# Patient Record
Sex: MALE | Race: WHITE | NOT HISPANIC OR LATINO | Employment: STUDENT | URBAN - METROPOLITAN AREA
[De-identification: names, ages, dates, MRNs, and addresses within clinical notes are randomized per-mention and may not be internally consistent; named-entity substitution may affect disease eponyms.]

---

## 2017-02-06 ENCOUNTER — TELEPHONE (OUTPATIENT)
Dept: PEDIATRIC NEUROLOGY | Facility: CLINIC | Age: 22
End: 2017-02-06

## 2017-02-27 ENCOUNTER — TELEPHONE (OUTPATIENT)
Dept: PEDIATRIC NEUROLOGY | Facility: CLINIC | Age: 22
End: 2017-02-27

## 2017-03-13 ENCOUNTER — TELEPHONE (OUTPATIENT)
Dept: PEDIATRIC NEUROLOGY | Facility: CLINIC | Age: 22
End: 2017-03-13

## 2017-03-20 RX ORDER — AMITRIPTYLINE HYDROCHLORIDE 10 MG/1
TABLET, FILM COATED ORAL
Qty: 60 TABLET | Refills: 0 | OUTPATIENT
Start: 2017-03-20

## 2017-06-20 ENCOUNTER — TELEPHONE (OUTPATIENT)
Dept: PEDIATRIC NEUROLOGY | Facility: CLINIC | Age: 22
End: 2017-06-20

## 2017-06-20 NOTE — TELEPHONE ENCOUNTER
Alivia, i will renew enough for an apptment. i have not seen him in 14 months. Thank you. Carmen correa 6/20/17 2:23 pm

## 2017-06-23 NOTE — TELEPHONE ENCOUNTER
Attempted to contact parents to schedule f/u appt; no answer. Message left for return call to clinic to schedule appt.

## 2017-06-27 RX ORDER — AMITRIPTYLINE HYDROCHLORIDE 10 MG/1
TABLET, FILM COATED ORAL
Qty: 60 TABLET | Refills: 0 | OUTPATIENT
Start: 2017-06-27

## 2017-07-24 ENCOUNTER — TELEPHONE (OUTPATIENT)
Dept: PEDIATRIC NEUROLOGY | Facility: CLINIC | Age: 22
End: 2017-07-24

## 2017-07-24 RX ORDER — SUMATRIPTAN SUCCINATE 100 MG/1
TABLET ORAL
Qty: 10 TABLET | Refills: 0 | Status: CANCELLED | OUTPATIENT
Start: 2017-07-24

## 2017-07-24 NOTE — TELEPHONE ENCOUNTER
----- Message from Adry To sent at 7/24/2017 12:01 PM CDT -----  Contact: Mom [phone]  Mom [phone].... Returning your call.  Mom is requesting a call back.

## 2017-07-24 NOTE — TELEPHONE ENCOUNTER
----- Message from Karen Peters sent at 7/24/2017 11:00 AM CDT -----  Contact: mom  [phone] or [phone] (pt)   Pt needs a follow up apt before leaving to go to college.   8-3-17 to 8-6-17  will be out of town and pt needs to come in before 8-17-17 states mom. I was speaking with mom and pt. Could not schedule a follow up because he's an adult pt.

## 2017-07-26 ENCOUNTER — TELEPHONE (OUTPATIENT)
Dept: PEDIATRIC NEUROLOGY | Facility: CLINIC | Age: 22
End: 2017-07-26

## 2017-07-26 RX ORDER — DICLOFENAC POTASSIUM 50 MG/1
POWDER, FOR SOLUTION ORAL
Qty: 10 EACH | Refills: 0 | Status: SHIPPED | OUTPATIENT
Start: 2017-07-26 | End: 2017-08-09 | Stop reason: SDUPTHER

## 2017-07-26 RX ORDER — SUMATRIPTAN SUCCINATE 100 MG/1
TABLET ORAL
Qty: 10 TABLET | Refills: 0 | Status: SHIPPED | OUTPATIENT
Start: 2017-07-26 | End: 2017-08-09 | Stop reason: SDUPTHER

## 2017-08-09 ENCOUNTER — OFFICE VISIT (OUTPATIENT)
Dept: PEDIATRIC NEUROLOGY | Facility: CLINIC | Age: 22
End: 2017-08-09
Payer: COMMERCIAL

## 2017-08-09 VITALS
WEIGHT: 173.63 LBS | BODY MASS INDEX: 24.86 KG/M2 | DIASTOLIC BLOOD PRESSURE: 72 MMHG | SYSTOLIC BLOOD PRESSURE: 125 MMHG | HEIGHT: 70 IN | HEART RATE: 104 BPM

## 2017-08-09 DIAGNOSIS — F90.0 ATTENTION DEFICIT HYPERACTIVITY DISORDER (ADHD), PREDOMINANTLY INATTENTIVE TYPE: ICD-10-CM

## 2017-08-09 DIAGNOSIS — Z60.8 SOCIAL PROBLEM IN SCHOOL: Chronic | ICD-10-CM

## 2017-08-09 DIAGNOSIS — R51.9 INTRACTABLE EPISODIC HEADACHE, UNSPECIFIED HEADACHE TYPE: Chronic | ICD-10-CM

## 2017-08-09 DIAGNOSIS — F84.0 AUTISM SPECTRUM DISORDER: Primary | ICD-10-CM

## 2017-08-09 DIAGNOSIS — Z55.8 SOCIAL PROBLEM IN SCHOOL: Chronic | ICD-10-CM

## 2017-08-09 PROBLEM — F90.9 ADHD: Status: ACTIVE | Noted: 2017-08-09

## 2017-08-09 PROCEDURE — 99999 PR PBB SHADOW E&M-EST. PATIENT-LVL III: CPT | Mod: PBBFAC,,, | Performed by: PSYCHIATRY & NEUROLOGY

## 2017-08-09 PROCEDURE — 3008F BODY MASS INDEX DOCD: CPT | Mod: S$GLB,,, | Performed by: PSYCHIATRY & NEUROLOGY

## 2017-08-09 PROCEDURE — 99215 OFFICE O/P EST HI 40 MIN: CPT | Mod: S$GLB,,, | Performed by: PSYCHIATRY & NEUROLOGY

## 2017-08-09 RX ORDER — DEXTROAMPHETAMINE SACCHARATE, AMPHETAMINE ASPARTATE MONOHYDRATE, DEXTROAMPHETAMINE SULFATE AND AMPHETAMINE SULFATE 2.5; 2.5; 2.5; 2.5 MG/1; MG/1; MG/1; MG/1
10 CAPSULE, EXTENDED RELEASE ORAL EVERY MORNING
COMMUNITY

## 2017-08-09 RX ORDER — DICLOFENAC POTASSIUM 50 MG/1
POWDER, FOR SOLUTION ORAL
Qty: 10 EACH | Refills: 5 | Status: SHIPPED | OUTPATIENT
Start: 2017-08-09 | End: 2018-06-13 | Stop reason: SDUPTHER

## 2017-08-09 RX ORDER — SUMATRIPTAN SUCCINATE 100 MG/1
TABLET ORAL
Qty: 10 TABLET | Refills: 5 | Status: SHIPPED | OUTPATIENT
Start: 2017-08-09 | End: 2018-06-13 | Stop reason: SDUPTHER

## 2017-08-09 RX ORDER — AMITRIPTYLINE HYDROCHLORIDE 25 MG/1
TABLET, FILM COATED ORAL
Qty: 30 TABLET | Refills: 5 | Status: SHIPPED | OUTPATIENT
Start: 2017-08-09 | End: 2018-06-13 | Stop reason: SDUPTHER

## 2017-08-09 SDOH — SOCIAL DETERMINANTS OF HEALTH (SDOH): OTHER PROBLEMS RELATED TO EDUCATION AND LITERACY: Z55.8

## 2017-08-09 SDOH — SOCIAL DETERMINANTS OF HEALTH (SDOH): OTHER PROBLEMS RELATED TO SOCIAL ENVIRONMENT: Z60.8

## 2017-08-09 NOTE — PROGRESS NOTES
Zechariah Potter is a 21-year-old male who I follow for headaches, Asperger's and   tics.  Zechariah returns with his mother.    I last saw Zechariah on 04/05/2016.    Zechariah finished his freshman year at [employer] in [place].  He is   involved in the Autism Spectrum Program.  It was a tough year.  However, he got   through it.  This summer, he took math at [employer].  He went to school three   hours a day every day, including the weekends.  The good news is that he passed   the math requirement.    Zechariah ended up with a 3.2 last semester.  He will start back on 08/23/2017.    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    He takes 10 hours a semester.  He takes three academic courses at three hours   each and one hour of a more enjoyable course, for which he receives credit, such   as kinesiology or indoor rock climbing.    Zechariah had lots of anxiety at [place] this year.  This year there will be a   different therapist and a different mentor.  Zechariah has worked hard this summer   in therapy to better verbalize his feelings.    Zechariah has been having breakthrough migraines approximately once a week.  The   Cambia and the Imitrex work when he has the migraines.    On neurologic examination today, Zechariah's weight is 78.75 kg.  His height is   178.5 cm.  His blood pressure is 125/72.  His pulse rate is 104 per minute.    Respiratory rate is 20 per minute.    Zechariah is a well-nourished, well-developed young man.  I do not appreciate any   tics today.  He is anxious, but he is able to verbalize his worries.    Heart reveals regular rate and rhythm.  Lungs are clear.    Zechariah has no ataxia.  He has no dysmetria.  He has no nystagmus.    Extraocular movements are full and conjugate.  I am unable to see his discs.  I   appreciate no facial asymmetry or weakness.    I was with Zechariah and his mother for 45 minutes.  Greater than 50% of the time   was spent counseling.  The discussion was about headaches, stressors, school.     We are going to go up on the amitriptyline to 25 mg p.o. at bedtime.  Mom is   going to call me after Zechariah tries the increase and gets back to school in the   next four weeks.  I have refilled the Cambia and the Imitrex.    I would like to see Zechariah back in six months or sooner if there are problems.    A copy of this dictation is sent to Dr. Cruz.      /hema [id number] xenia(s)        GEN/ALLY  dd: 08/09/2017 22:23:02 (CDT)  td: 08/10/2017 04:05:01 (CDT)  Doc ID   #[id number]  Job ID #[id number]    CC: Hong Cruz M.D.

## 2017-08-16 ENCOUNTER — TELEPHONE (OUTPATIENT)
Dept: PEDIATRIC NEUROLOGY | Facility: CLINIC | Age: 22
End: 2017-08-16

## 2017-08-16 NOTE — TELEPHONE ENCOUNTER
----- Message from Nat Subramanian sent at 8/16/2017  2:08 PM CDT -----  Contact: [phone] mom  Script is not covered by Ins. diclofenac potassium (CAMBIA) 50 mg PwPk.Please call to advise

## 2017-08-17 ENCOUNTER — PATIENT MESSAGE (OUTPATIENT)
Dept: PEDIATRIC NEUROLOGY | Facility: CLINIC | Age: 22
End: 2017-08-17

## 2017-08-17 ENCOUNTER — TELEPHONE (OUTPATIENT)
Dept: PEDIATRIC NEUROLOGY | Facility: CLINIC | Age: 22
End: 2017-08-17

## 2018-05-17 ENCOUNTER — TELEPHONE (OUTPATIENT)
Dept: PEDIATRIC NEUROLOGY | Facility: CLINIC | Age: 23
End: 2018-05-17

## 2018-05-17 NOTE — TELEPHONE ENCOUNTER
----- Message from Shelby Palmer sent at 5/16/2018  4:42 PM CDT -----  Contact: Grey [phone]  Grey calling to schedule a follow up appt with Dr. FAIR

## 2018-06-07 ENCOUNTER — OFFICE VISIT (OUTPATIENT)
Dept: URGENT CARE | Facility: CLINIC | Age: 23
End: 2018-06-07
Payer: COMMERCIAL

## 2018-06-07 VITALS
RESPIRATION RATE: 19 BRPM | TEMPERATURE: 97 F | HEART RATE: 107 BPM | BODY MASS INDEX: 24.77 KG/M2 | WEIGHT: 173 LBS | HEIGHT: 70 IN | SYSTOLIC BLOOD PRESSURE: 123 MMHG | DIASTOLIC BLOOD PRESSURE: 75 MMHG | OXYGEN SATURATION: 99 %

## 2018-06-07 DIAGNOSIS — J06.9 VIRAL URI WITH COUGH: Primary | ICD-10-CM

## 2018-06-07 PROCEDURE — 99203 OFFICE O/P NEW LOW 30 MIN: CPT | Mod: 25,S$GLB,, | Performed by: PHYSICIAN ASSISTANT

## 2018-06-07 PROCEDURE — 96372 THER/PROPH/DIAG INJ SC/IM: CPT | Mod: S$GLB,,, | Performed by: PHYSICIAN ASSISTANT

## 2018-06-07 RX ORDER — BETAMETHASONE SODIUM PHOSPHATE AND BETAMETHASONE ACETATE 3; 3 MG/ML; MG/ML
6 INJECTION, SUSPENSION INTRA-ARTICULAR; INTRALESIONAL; INTRAMUSCULAR; SOFT TISSUE
Status: COMPLETED | OUTPATIENT
Start: 2018-06-07 | End: 2018-06-07

## 2018-06-07 RX ORDER — AZITHROMYCIN 250 MG/1
TABLET, FILM COATED ORAL
Qty: 6 TABLET | Refills: 0 | Status: SHIPPED | OUTPATIENT
Start: 2018-06-07 | End: 2020-11-30

## 2018-06-07 RX ORDER — BENZONATATE 100 MG/1
200 CAPSULE ORAL 3 TIMES DAILY PRN
Qty: 40 CAPSULE | Refills: 0 | Status: SHIPPED | OUTPATIENT
Start: 2018-06-07 | End: 2020-11-30

## 2018-06-07 RX ADMIN — BETAMETHASONE SODIUM PHOSPHATE AND BETAMETHASONE ACETATE 6 MG: 3; 3 INJECTION, SUSPENSION INTRA-ARTICULAR; INTRALESIONAL; INTRAMUSCULAR; SOFT TISSUE at 12:06

## 2018-06-07 NOTE — PROGRESS NOTES
"Subjective:       Patient ID: Zechariah Potter is a 22 y.o. male.    Vitals:  height is 5' 10" (1.778 m) and weight is 78.5 kg (173 lb). His oral temperature is 97.1 °F (36.2 °C). His blood pressure is 123/75 and his pulse is 107. His respiration is 19 and oxygen saturation is 99%.     Chief Complaint: Cough    Patient states he been having a cough and some congestion since Tuesday.      Cough   This is a new problem. The current episode started in the past 7 days. The problem has been gradually worsening. The problem occurs constantly. The cough is non-productive. Associated symptoms include nasal congestion and postnasal drip. Pertinent negatives include no chest pain, chills, ear pain, eye redness, fever, headaches, myalgias, sore throat, shortness of breath or wheezing. The symptoms are aggravated by lying down. Treatments tried: mucinex and flonase. The treatment provided no relief. His past medical history is significant for bronchitis and pneumonia. There is no history of asthma, COPD, emphysema or environmental allergies.     Review of Systems   Constitution: Negative for chills, decreased appetite, fever and malaise/fatigue.   HENT: Positive for congestion and postnasal drip. Negative for ear pain, hoarse voice and sore throat.    Eyes: Negative for discharge and redness.   Cardiovascular: Negative for chest pain, dyspnea on exertion and leg swelling.   Respiratory: Positive for cough. Negative for shortness of breath, sputum production and wheezing.    Musculoskeletal: Negative for myalgias.   Gastrointestinal: Negative for abdominal pain and nausea.   Neurological: Negative for headaches.   Allergic/Immunologic: Negative for environmental allergies.       Objective:      Physical Exam   Constitutional: He is oriented to person, place, and time. He appears well-developed and well-nourished. He is cooperative.  Non-toxic appearance. He does not appear ill. No distress.   HENT:   Head: Normocephalic and " atraumatic.   Right Ear: Hearing, tympanic membrane, external ear and ear canal normal.   Left Ear: Hearing, tympanic membrane, external ear and ear canal normal.   Nose: Rhinorrhea present. No mucosal edema or nasal deformity. No epistaxis. Right sinus exhibits no maxillary sinus tenderness and no frontal sinus tenderness. Left sinus exhibits no maxillary sinus tenderness and no frontal sinus tenderness.   Mouth/Throat: Uvula is midline and mucous membranes are normal. No trismus in the jaw. Normal dentition. No uvula swelling. Posterior oropharyngeal erythema present. Tonsils are 1+ on the right. Tonsils are 1+ on the left. No tonsillar exudate.   PND noted   Eyes: Conjunctivae and lids are normal. No scleral icterus.   Sclera clear bilat   Neck: Trachea normal, full passive range of motion without pain and phonation normal. Neck supple.   Cardiovascular: Normal rate, regular rhythm, normal heart sounds, intact distal pulses and normal pulses.    Pulmonary/Chest: Effort normal and breath sounds normal. No accessory muscle usage. No respiratory distress. He has no decreased breath sounds. He has no wheezes. He has no rhonchi. He has no rales.   Occasional nonproductive cough noted   Abdominal: Soft. Normal appearance and bowel sounds are normal. He exhibits no distension. There is no tenderness.   Musculoskeletal: Normal range of motion. He exhibits no edema or deformity.   Neurological: He is alert and oriented to person, place, and time. He exhibits normal muscle tone. Coordination normal.   Skin: Skin is warm, dry and intact. He is not diaphoretic. No pallor.   Psychiatric: He has a normal mood and affect. His speech is normal and behavior is normal. Judgment and thought content normal. Cognition and memory are normal.   Nursing note and vitals reviewed.      Assessment:       1. Viral URI with cough        Plan:         Viral URI with cough  -     betamethasone acetate-betamethasone sodium phosphate injection 6  mg; Inject 1 mL (6 mg total) into the muscle one time.  -     benzonatate (TESSALON PERLES) 100 MG capsule; Take 2 capsules (200 mg total) by mouth 3 (three) times daily as needed.  Dispense: 40 capsule; Refill: 0  -     azithromycin (ZITHROMAX Z-LORETTA) 250 MG tablet; Take 2 tablets (500 mg) on  Day 1,  followed by 1 tablet (250 mg) once daily on Days 2 through 5.  Dispense: 6 tablet; Refill: 0    WAIT AND SEE ANTIBIOTIC GIVEN AS PATIENT IS PRONE TO SECONDARY BACTERIAL INFECTIONS. SPECIFIC INSTRUCTIONS GIVEN ON WHEN TO TAKE THE ANTIBIOTICS IF NECESSARY.       Viral Upper Respiratory Illness (Adult)  You have a viral upper respiratory illness (URI), which is another term for the common cold. This illness is contagious during the first few days. It is spread through the air by coughing and sneezing. It may also be spread by direct contact (touching the sick person and then touching your own eyes, nose, or mouth). Frequent handwashing will decrease risk of spread. Most viral illnesses go away within 7 to 10 days with rest and simple home remedies. Sometimes the illness may last for several weeks. Antibiotics will not kill a virus, and they are generally not prescribed for this condition.    Home care  · If symptoms are severe, rest at home for the first 2 to 3 days. When you resume activity, don't let yourself get too tired.  · Avoid being exposed to cigarette smoke (yours or others).  · You may use acetaminophen or ibuprofen to control pain and fever, unless another medicine was prescribed. (Note: If you have chronic liver or kidney disease, have ever had a stomach ulcer or gastrointestinal bleeding, or are taking blood-thinning medicines, talk with your healthcare provider before using these medicines.) Aspirin should never be given to anyone under 18 years of age who is ill with a viral infection or fever. It may cause severe liver or brain damage.  · Your appetite may be poor, so a light diet is fine. Avoid  dehydration by drinking 6 to 8 glasses of fluids per day (water, soft drinks, juices, tea, or soup). Extra fluids will help loosen secretions in the nose and lungs.  · Over-the-counter cold medicines will not shorten the length of time youre sick, but they may be helpful for the following symptoms: cough, sore throat, and nasal and sinus congestion. (Note: Do not use decongestants if you have high blood pressure.)  Follow-up care  Follow up with your healthcare provider, or as advised.  When to seek medical advice  Call your healthcare provider right away if any of these occur:  · Cough with lots of colored sputum (mucus)  · Severe headache; face, neck, or ear pain  · Difficulty swallowing due to throat pain  · Fever of 100.4°F (38°C)  Call 911, or get immediate medical care  Call emergency services right away if any of these occur:  · Chest pain, shortness of breath, wheezing, or difficulty breathing  · Coughing up blood  · Inability to swallow due to throat pain  Date Last Reviewed: 9/13/2015  © 9066-7875 [employer]. [address] [place] [address]. All rights reserved. This information is not intended as a substitute for professional medical care. Always follow your healthcare professional's instructions.      Please follow up with your Primary care provider within 2-5 days if your signs and symptoms have not resolved or worsen.     If your condition worsens or fails to improve we recommend that you receive another evaluation at the emergency room immediately or contact your primary medical clinic to discuss your concerns.   You must understand that you have received an Urgent Care treatment only and that you may be released before all of your medical problems are known or treated. You, the patient, will arrange for follow up care as instructed.     YOU HAVE BEEN GIVEN A PRESCRIPTION FOR ANTIBIOTICS. IT WAS ADVISED TO DELAY THE COURSE OF ANTIBIOTICS FOR 2-3 DAYS IF SYMPTOMS DO NOT  RESOLVE. IT IMPORTANT TO FOLLOW THESE INSTRUCTIONS AS ANTIBIOTIC RESISTANCE IS HIGH. YOUR SYMPTOMS WILL LIKELY RESOLVE WITHOUT THIS PRESCRIPTIONS.

## 2018-06-07 NOTE — PATIENT INSTRUCTIONS
Viral Upper Respiratory Illness (Adult)  You have a viral upper respiratory illness (URI), which is another term for the common cold. This illness is contagious during the first few days. It is spread through the air by coughing and sneezing. It may also be spread by direct contact (touching the sick person and then touching your own eyes, nose, or mouth). Frequent handwashing will decrease risk of spread. Most viral illnesses go away within 7 to 10 days with rest and simple home remedies. Sometimes the illness may last for several weeks. Antibiotics will not kill a virus, and they are generally not prescribed for this condition.    Home care  · If symptoms are severe, rest at home for the first 2 to 3 days. When you resume activity, don't let yourself get too tired.  · Avoid being exposed to cigarette smoke (yours or others).  · You may use acetaminophen or ibuprofen to control pain and fever, unless another medicine was prescribed. (Note: If you have chronic liver or kidney disease, have ever had a stomach ulcer or gastrointestinal bleeding, or are taking blood-thinning medicines, talk with your healthcare provider before using these medicines.) Aspirin should never be given to anyone under 18 years of age who is ill with a viral infection or fever. It may cause severe liver or brain damage.  · Your appetite may be poor, so a light diet is fine. Avoid dehydration by drinking 6 to 8 glasses of fluids per day (water, soft drinks, juices, tea, or soup). Extra fluids will help loosen secretions in the nose and lungs.  · Over-the-counter cold medicines will not shorten the length of time youre sick, but they may be helpful for the following symptoms: cough, sore throat, and nasal and sinus congestion. (Note: Do not use decongestants if you have high blood pressure.)  Follow-up care  Follow up with your healthcare provider, or as advised.  When to seek medical advice  Call your healthcare provider right away if any  of these occur:  · Cough with lots of colored sputum (mucus)  · Severe headache; face, neck, or ear pain  · Difficulty swallowing due to throat pain  · Fever of 100.4°F (38°C)  Call 911, or get immediate medical care  Call emergency services right away if any of these occur:  · Chest pain, shortness of breath, wheezing, or difficulty breathing  · Coughing up blood  · Inability to swallow due to throat pain  Date Last Reviewed: 9/13/2015  © 3816-9908 [employer]. [address] [place] [address]. All rights reserved. This information is not intended as a substitute for professional medical care. Always follow your healthcare professional's instructions.      Please follow up with your Primary care provider within 2-5 days if your signs and symptoms have not resolved or worsen.     If your condition worsens or fails to improve we recommend that you receive another evaluation at the emergency room immediately or contact your primary medical clinic to discuss your concerns.   You must understand that you have received an Urgent Care treatment only and that you may be released before all of your medical problems are known or treated. You, the patient, will arrange for follow up care as instructed.     YOU HAVE BEEN GIVEN A PRESCRIPTION FOR ANTIBIOTICS. IT WAS ADVISED TO DELAY THE COURSE OF ANTIBIOTICS FOR 2-3 DAYS IF SYMPTOMS DO NOT RESOLVE. IT IMPORTANT TO FOLLOW THESE INSTRUCTIONS AS ANTIBIOTIC RESISTANCE IS HIGH. YOUR SYMPTOMS WILL LIKELY RESOLVE WITHOUT THIS PRESCRIPTIONS.

## 2018-06-13 ENCOUNTER — OFFICE VISIT (OUTPATIENT)
Dept: PEDIATRIC NEUROLOGY | Facility: CLINIC | Age: 23
End: 2018-06-13
Payer: COMMERCIAL

## 2018-06-13 VITALS
HEART RATE: 82 BPM | WEIGHT: 178.25 LBS | SYSTOLIC BLOOD PRESSURE: 134 MMHG | BODY MASS INDEX: 25.52 KG/M2 | DIASTOLIC BLOOD PRESSURE: 78 MMHG | HEIGHT: 70 IN

## 2018-06-13 DIAGNOSIS — Z60.8 SOCIAL PROBLEM IN SCHOOL: Chronic | ICD-10-CM

## 2018-06-13 DIAGNOSIS — F84.0 AUTISM SPECTRUM DISORDER: ICD-10-CM

## 2018-06-13 DIAGNOSIS — F90.0 ATTENTION DEFICIT HYPERACTIVITY DISORDER (ADHD), PREDOMINANTLY INATTENTIVE TYPE: ICD-10-CM

## 2018-06-13 DIAGNOSIS — R51.9 INTRACTABLE EPISODIC HEADACHE, UNSPECIFIED HEADACHE TYPE: Chronic | ICD-10-CM

## 2018-06-13 DIAGNOSIS — R51.9 BILATERAL HEADACHE: Primary | ICD-10-CM

## 2018-06-13 DIAGNOSIS — Z55.8 SOCIAL PROBLEM IN SCHOOL: Chronic | ICD-10-CM

## 2018-06-13 PROCEDURE — 99999 PR PBB SHADOW E&M-EST. PATIENT-LVL III: CPT | Mod: PBBFAC,,, | Performed by: PSYCHIATRY & NEUROLOGY

## 2018-06-13 PROCEDURE — 99214 OFFICE O/P EST MOD 30 MIN: CPT | Mod: S$GLB,,, | Performed by: PSYCHIATRY & NEUROLOGY

## 2018-06-13 PROCEDURE — 3008F BODY MASS INDEX DOCD: CPT | Mod: CPTII,S$GLB,, | Performed by: PSYCHIATRY & NEUROLOGY

## 2018-06-13 RX ORDER — AMITRIPTYLINE HYDROCHLORIDE 25 MG/1
TABLET, FILM COATED ORAL
Qty: 60 TABLET | Refills: 5 | Status: SHIPPED | OUTPATIENT
Start: 2018-06-13 | End: 2018-12-22 | Stop reason: SDUPTHER

## 2018-06-13 RX ORDER — SUMATRIPTAN SUCCINATE 100 MG/1
TABLET ORAL
Qty: 10 TABLET | Refills: 5 | Status: SHIPPED | OUTPATIENT
Start: 2018-06-13 | End: 2022-01-07 | Stop reason: ALTCHOICE

## 2018-06-13 RX ORDER — DICLOFENAC POTASSIUM 50 MG/1
POWDER, FOR SOLUTION ORAL
Qty: 10 EACH | Refills: 5 | Status: SHIPPED | OUTPATIENT
Start: 2018-06-13 | End: 2021-11-10

## 2018-06-13 SDOH — SOCIAL DETERMINANTS OF HEALTH (SDOH): OTHER PROBLEMS RELATED TO SOCIAL ENVIRONMENT: Z60.8

## 2018-06-13 SDOH — SOCIAL DETERMINANTS OF HEALTH (SDOH): OTHER PROBLEMS RELATED TO EDUCATION AND LITERACY: Z55.8

## 2018-06-13 NOTE — PROGRESS NOTES
Zechariah Potter is a 22-year-old male who I follow for headaches, Asperger's and   tics.  Zechariah returns today with his mother.    I last saw Zechariah on 08/09/2017.    When I last saw Zechariah, he had had a very difficult year at the [employer] in [place].  This was a good year.  He ended up with an A plus in   public speaking with two B pluses and a B.  His overall grade average was 3.57.    He is taking an online communication course this summer.    Zechariah has a car now.  He will have an apartment next year at college.  He has a   job this summer.  I think things are going well.    However, Zechariah has had a significant amount of headaches during the year at   school.  It is better over the summer.  We have talked about anxiety and   pressure.    Mother and Zechariah feel that the Cambia and the Imitrex work well when he has   migraines.  He tries to remember to eat and drink regularly.  That is not always   possible.    On neurologic examination today, Zechariah's blood pressure is 134/78.  His pulse   rate is 82 per minute.  His respiratory rate is 20 per minute.  His weight is   80.85 kg (an increase of 2.1 kg).  Height is 178.7 cm.    Zechariah is a well-nourished, well-developed young man.  I do not appreciate any   tics today.  He is anxious.    Zechariah has no ataxia.  He has no dysmetria.  He has no nystagmus.    Heart reveals regular rate and rhythm.  Lungs are clear.    Extraocular movements are full and conjugate.  Discs are sharp.  I appreciate no   facial asymmetry or weakness.    I was with Zechariah and his mother for 25 minutes.  Greater than 50% of the time   was spent counseling.  The discussion was whether to increase the amitriptyline   or change to Topamax.  At this time, the decision is to increase amitriptyline   to 50 mg p.o. at bedtime.  I have sent in refills for the Cambia and the   Imitrex.    Zechariah has read about Arnold-Chiari malformation.  He is very concerned that he   may have one.   He says he frequently has dizziness associated with his   headaches.  The last scan was in 2012.    After much discussion, we have decided to obtain an MRI of Zechariah's brain and   cervical spine without contrast.    Mom is to call me for the results or sooner if there are problems.    A copy of this consultation will be sent to Dr. Cruz.      GEN/ALLY  dd: 06/13/2018 11:14:43 (CDT)  td: 06/14/2018 09:40:47 (CDT)  Doc ID   #[id number]  Job ID #[id number]    CC: Hong Cruz M.D.

## 2018-06-18 ENCOUNTER — HOSPITAL ENCOUNTER (OUTPATIENT)
Dept: RADIOLOGY | Facility: HOSPITAL | Age: 23
Discharge: HOME | End: 2018-06-18
Attending: PSYCHIATRY & NEUROLOGY
Payer: COMMERCIAL

## 2018-06-18 DIAGNOSIS — R51.9 BILATERAL HEADACHE: ICD-10-CM

## 2018-06-18 PROCEDURE — 70551 MRI BRAIN STEM W/O DYE: CPT | Mod: TC

## 2018-06-18 PROCEDURE — 72141 MRI NECK SPINE W/O DYE: CPT | Mod: 26,,, | Performed by: RADIOLOGY

## 2018-06-18 PROCEDURE — 70551 MRI BRAIN STEM W/O DYE: CPT | Mod: 26,,, | Performed by: RADIOLOGY

## 2018-06-18 PROCEDURE — 72141 MRI NECK SPINE W/O DYE: CPT | Mod: TC

## 2018-06-20 ENCOUNTER — TELEPHONE (OUTPATIENT)
Dept: PEDIATRIC NEUROLOGY | Facility: CLINIC | Age: 23
End: 2018-06-20

## 2018-06-20 ENCOUNTER — PATIENT MESSAGE (OUTPATIENT)
Dept: PEDIATRIC NEUROLOGY | Facility: CLINIC | Age: 23
End: 2018-06-20

## 2018-06-25 ENCOUNTER — TELEPHONE (OUTPATIENT)
Dept: PEDIATRIC NEUROLOGY | Facility: CLINIC | Age: 23
End: 2018-06-25

## 2018-06-27 ENCOUNTER — PATIENT MESSAGE (OUTPATIENT)
Dept: PEDIATRIC NEUROLOGY | Facility: CLINIC | Age: 23
End: 2018-06-27

## 2018-07-02 ENCOUNTER — TELEPHONE (OUTPATIENT)
Dept: PEDIATRIC NEUROLOGY | Facility: CLINIC | Age: 23
End: 2018-07-02

## 2018-07-05 ENCOUNTER — TELEPHONE (OUTPATIENT)
Dept: PEDIATRIC NEUROLOGY | Facility: CLINIC | Age: 23
End: 2018-07-05

## 2018-07-16 ENCOUNTER — TELEPHONE (OUTPATIENT)
Dept: PEDIATRIC NEUROLOGY | Facility: CLINIC | Age: 23
End: 2018-07-16

## 2018-07-17 ENCOUNTER — TELEPHONE (OUTPATIENT)
Dept: PEDIATRIC NEUROLOGY | Facility: CLINIC | Age: 23
End: 2018-07-17

## 2018-07-17 NOTE — TELEPHONE ENCOUNTER
Left yet again another message both on cell and home phones. Thank you. Carmen Morton 7/17/18 6:28 pm

## 2018-07-18 ENCOUNTER — PATIENT MESSAGE (OUTPATIENT)
Dept: PEDIATRIC NEUROLOGY | Facility: CLINIC | Age: 23
End: 2018-07-18

## 2018-07-19 ENCOUNTER — TELEPHONE (OUTPATIENT)
Dept: PEDIATRIC NEUROLOGY | Facility: CLINIC | Age: 23
End: 2018-07-19

## 2018-07-19 NOTE — TELEPHONE ENCOUNTER
Left yet again one more message - let's send a certified letter - okay? Thanks. Carmen Morton 7/19/18 7:12 am

## 2018-10-02 NOTE — TELEPHONE ENCOUNTER
Attempted to contact parent/patient to schedule follow up appt per Dr Morton; no answer. Message left for return call to clinic to schedule appt.  
02-Oct-2018 23:03

## 2019-01-07 RX ORDER — AMITRIPTYLINE HYDROCHLORIDE 25 MG/1
TABLET, FILM COATED ORAL
Qty: 60 TABLET | Refills: 0 | Status: SHIPPED | OUTPATIENT
Start: 2019-01-07 | End: 2021-11-02

## 2019-03-25 RX ORDER — AMITRIPTYLINE HYDROCHLORIDE 25 MG/1
TABLET, FILM COATED ORAL
Qty: 60 TABLET | Refills: 0 | Status: SHIPPED | OUTPATIENT
Start: 2019-03-25 | End: 2019-04-22 | Stop reason: SDUPTHER

## 2019-04-22 RX ORDER — AMITRIPTYLINE HYDROCHLORIDE 25 MG/1
TABLET, FILM COATED ORAL
Qty: 60 TABLET | Refills: 0 | Status: SHIPPED | OUTPATIENT
Start: 2019-04-22 | End: 2021-11-02

## 2019-06-18 RX ORDER — AMITRIPTYLINE HYDROCHLORIDE 25 MG/1
TABLET, FILM COATED ORAL
Qty: 60 TABLET | Refills: 0 | OUTPATIENT
Start: 2019-06-18

## 2019-07-11 RX ORDER — AMITRIPTYLINE HYDROCHLORIDE 25 MG/1
TABLET, FILM COATED ORAL
Qty: 60 TABLET | Refills: 0 | OUTPATIENT
Start: 2019-07-11

## 2019-07-25 RX ORDER — AMITRIPTYLINE HYDROCHLORIDE 25 MG/1
TABLET, FILM COATED ORAL
Qty: 60 TABLET | Refills: 0 | OUTPATIENT
Start: 2019-07-25

## 2020-11-30 ENCOUNTER — OFFICE VISIT (OUTPATIENT)
Dept: URGENT CARE | Facility: CLINIC | Age: 25
End: 2020-11-30
Payer: COMMERCIAL

## 2020-11-30 VITALS
SYSTOLIC BLOOD PRESSURE: 120 MMHG | RESPIRATION RATE: 20 BRPM | OXYGEN SATURATION: 97 % | HEART RATE: 110 BPM | WEIGHT: 180 LBS | HEIGHT: 70 IN | BODY MASS INDEX: 25.77 KG/M2 | TEMPERATURE: 99 F | DIASTOLIC BLOOD PRESSURE: 63 MMHG

## 2020-11-30 DIAGNOSIS — J06.9 VIRAL UPPER RESPIRATORY TRACT INFECTION: ICD-10-CM

## 2020-11-30 DIAGNOSIS — R05.9 COUGH: Primary | ICD-10-CM

## 2020-11-30 LAB
CTP QC/QA: YES
SARS-COV-2 RDRP RESP QL NAA+PROBE: NEGATIVE

## 2020-11-30 PROCEDURE — U0002 COVID-19 LAB TEST NON-CDC: HCPCS | Mod: QW,S$GLB,, | Performed by: FAMILY MEDICINE

## 2020-11-30 PROCEDURE — U0002: ICD-10-PCS | Mod: QW,S$GLB,, | Performed by: FAMILY MEDICINE

## 2020-11-30 PROCEDURE — 3008F PR BODY MASS INDEX (BMI) DOCUMENTED: ICD-10-PCS | Mod: CPTII,S$GLB,, | Performed by: FAMILY MEDICINE

## 2020-11-30 PROCEDURE — 99214 PR OFFICE/OUTPT VISIT, EST, LEVL IV, 30-39 MIN: ICD-10-PCS | Mod: S$GLB,,, | Performed by: FAMILY MEDICINE

## 2020-11-30 PROCEDURE — 99214 OFFICE O/P EST MOD 30 MIN: CPT | Mod: S$GLB,,, | Performed by: FAMILY MEDICINE

## 2020-11-30 PROCEDURE — 3008F BODY MASS INDEX DOCD: CPT | Mod: CPTII,S$GLB,, | Performed by: FAMILY MEDICINE

## 2020-11-30 RX ORDER — BENZONATATE 100 MG/1
CAPSULE ORAL
Qty: 30 CAPSULE | Refills: 1 | Status: SHIPPED | OUTPATIENT
Start: 2020-11-30 | End: 2023-12-09

## 2020-11-30 NOTE — PATIENT INSTRUCTIONS
Instructions for Patients with Confirmed or Suspected COVID-19    If you are awaiting your test result, you will either be called or it will be released to the patient portal.  If you have any questions about your test, please visit [email or web address] or call our COVID-19 information line at [phone].      Stay home and stay away from family members and friends. You may leave home and/or return to work when the following conditions are met:   24 hours fever free without fever-reducing medications AND   Improved symptoms     Separate yourself from other people and animals in your home.   Call ahead before visiting your doctor.   Wear a facemask.   Cover your coughs and sneezes.   Wash your hands often with soap and water; hand  can be used, too.   Avoid sharing personal household items.   Wipe down surfaces used daily.   Monitor your symptoms. Seek prompt medical attention if your illness is worsening (e.g., difficulty breathing).    Before seeking care, call your healthcare provider.   If you have a medical emergency and need to call 911, notify the dispatch personnel that you have, or are being evaluated for COVID-19. If possible, put on a facemask before emergency medical services arrive.        Recommended precautions for household members, intimate partners, and caregivers in a home setting of a patient with symptomatic laboratory-confirmed COVID-19 or a patient under investigation.  Household members, intimate partners, and caregivers in the home setting awaiting tests results have close contact with a person with symptomatic, laboratory-confirmed COVID-19 or a person under investigation. Close contacts should monitor their health; they should call their provider right away if they develop symptoms suggestive of COVID-19 (e.g., fever, cough, shortness of breath).    Close contacts should also follow these recommendations:   Make sure that you understand and can help the  patient follow their provider's instructions for medication(s) and care. You should help the patient with basic needs in the home and provide support for getting groceries, prescriptions, and other personal needs.   Monitor the patient's symptoms. If the patient is getting sicker, call his or her healthcare provider and tell them that the patient has laboratory-confirmed COVID-19. If the patient has a medical emergency and you need to call 911, notify the dispatch personnel that the patient has, or is being evaluated for COVID-19.   Household members should stay in another room or be  from the patient. Household members should use a separate bedroom and bathroom, if available.   Prohibit visitors.   Household members should care for any pets in the home.   Make sure that shared spaces in the home have good air flow, such as by an air conditioner or an opened window, weather permitting.   Perform hand hygiene frequently. Wash your hands often with soap and water for at least 20 seconds or use an alcohol-based hand  (that contains > 60% alcohol) covering all surfaces of your hands and rubbing them together until they feel dry. Soap and water should be used preferentially.   Avoid touching your eyes, nose, and mouth.   The patient should wear a facemask. If the patient is not able to wear a facemask (for example, because it causes trouble breathing), caregivers should wear a mask when they are in the same room as the patient.   Wear a disposable facemask and gloves when you touch or have contact with the patient's blood, stool, or body fluids, such as saliva, sputum, nasal mucus, vomit, urine.  o Throw out disposable facemasks and gloves after using them. Do not reuse.  o When removing personal protective equipment, first remove and dispose of gloves. Then, immediately clean your hands with soap and water or alcohol-based hand . Next, remove and dispose of facemask, and immediately  clean your hands again with soap and water or alcohol-based hand .   You should not share dishes, drinking glasses, cups, eating utensils, towels, bedding, or other items with the patient. After the patient uses these items, you should wash them thoroughly (see below Wash laundry thoroughly).   Clean all high-touch surfaces, such as counters, tabletops, doorknobs, bathroom fixtures, toilets, phones, keyboards, tablets, and bedside tables, every day. Also, clean any surfaces that may have blood, stool, or body fluids on them.   Use a household cleaning spray or wipe, according to the label instructions. Labels contain instructions for safe and effective use of the cleaning product including precautions you should take when applying the product, such as wearing gloves and making sure you have good ventilation during use of the product.   Wash laundry thoroughly.  o Immediately remove and wash clothes or bedding that have blood, stool, or body fluids on them.  o Wear disposable gloves while handling soiled items and keep soiled items away from your body. Clean your hands (with soap and water or an alcohol-based hand ) immediately after removing your gloves.  o Read and follow directions on labels of laundry or clothing items and detergent. In general, using a normal laundry detergent according to washing machine instructions and dry thoroughly using the warmest temperatures recommended on the clothing label.   Place all used disposable gloves, facemasks, and other contaminated items in a lined container before disposing of them with other household waste. Clean your hands (with soap and water or an alcohol-based hand ) immediately after handling these items. Soap and water should be used preferentially if hands are visibly dirty.   Discuss any additional questions with your state or local health department or healthcare provider. Check available hours when contacting your local  health department.    For more information see CDC link below.      https://www.cdc.gov/coronavirus/2019-ncov/hcp/guidance-prevent-spread.html#precautions        Sources:  [facility], [place] Department of Health and \[facility]\""      Your test was NEGATIVE for COVID-19 (coronavirus).        If your symptoms worsen or if you have any other concerns, please contact [facility] at [phone].     Sincerely,    Susan Moore, DO    Viral Upper Respiratory Illness (Adult)  You have a viral upper respiratory illness (URI), which is another term for the common cold. This illness is contagious during the first few days. It is spread through the air by coughing and sneezing. It may also be spread by direct contact (touching the sick person and then touching your own eyes, nose, or mouth). Frequent handwashing will decrease risk of spread. Most viral illnesses go away within 7 to 10 days with rest and simple home remedies. Sometimes the illness may last for several weeks. Antibiotics will not kill a virus, and they are generally not prescribed for this condition.    Home care  · If symptoms are severe, rest at home for the first 2 to 3 days. When you resume activity, don't let yourself get too tired.  · Avoid being exposed to cigarette smoke (yours or others).  · You may use acetaminophen or ibuprofen to control pain and fever, unless another medicine was prescribed. (Note: If you have chronic liver or kidney disease, have ever had a stomach ulcer or gastrointestinal bleeding, or are taking blood-thinning medicines, talk with your healthcare provider before using these medicines.) Aspirin should never be given to anyone under 18 years of age who is ill with a viral infection or fever. It may cause severe liver or brain damage.  · Your appetite may be poor, so a light diet is fine. Avoid dehydration by drinking 6 to 8 glasses of fluids per day (water, soft drinks, juices, tea, or soup). Extra fluids will help loosen secretions  in the nose and lungs.  · Over-the-counter cold medicines will not shorten the length of time youre sick, but they may be helpful for the following symptoms: cough, sore throat, and nasal and sinus congestion. (Note: Do not use decongestants if you have high blood pressure.)  Follow-up care  Follow up with your healthcare provider, or as advised.  When to seek medical advice  Call your healthcare provider right away if any of these occur:  · Cough with lots of colored sputum (mucus)  · Severe headache; face, neck, or ear pain  · Difficulty swallowing due to throat pain  · Fever of 100.4°F (38°C)  Call 911, or get immediate medical care  Call emergency services right away if any of these occur:  · Chest pain, shortness of breath, wheezing, or difficulty breathing  · Coughing up blood  · Inability to swallow due to throat pain  Date Last Reviewed: 9/13/2015  © 1667-9413 [employer], [employer]. [address] [place] [address], [place] [address]. All rights reserved. This information is not intended as a substitute for professional medical care. Always follow your healthcare professional's instructions.

## 2020-11-30 NOTE — PROGRESS NOTES
"Subjective:       Patient ID: Zechariah Potter is a 25 y.o. male.    Vitals:  height is 5' 10" (1.778 m) and weight is 81.6 kg (180 lb). His temperature is 98.8 °F (37.1 °C). His blood pressure is 120/63 and his pulse is 110. His respiration is 20 and oxygen saturation is 97%.     Chief Complaint: Cough    25 year old male c/o cough that started a week ago.  He states he did have a sore throat yesterday, but does not have it at the moment.  He has tried OTC cough suppressnats and states no relief to his symptoms.     Cough  This is a new problem. The current episode started in the past 7 days. The problem has been gradually worsening. The problem occurs every few minutes. The cough is non-productive. Associated symptoms include a sore throat. Pertinent negatives include no chest pain, chills, ear congestion, ear pain, eye redness, fever, headaches, heartburn, hemoptysis, myalgias, nasal congestion, postnasal drip, rash, rhinorrhea, shortness of breath, sweats, weight loss or wheezing. The symptoms are aggravated by lying down. He has tried OTC cough suppressant for the symptoms. The treatment provided no relief. His past medical history is significant for asthma. There is no history of bronchiectasis, bronchitis, COPD, emphysema, environmental allergies or pneumonia.       Constitution: Negative for chills, sweating, fatigue and fever.   HENT: Positive for sore throat. Negative for ear pain, congestion, postnasal drip, sinus pain, sinus pressure and voice change.    Neck: Negative for painful lymph nodes.   Cardiovascular: Negative for chest pain.   Eyes: Negative for eye redness.   Respiratory: Positive for cough. Negative for chest tightness, sputum production, bloody sputum, COPD, shortness of breath, stridor and wheezing.    Gastrointestinal: Negative for nausea, vomiting and heartburn.   Musculoskeletal: Negative for muscle ache.   Skin: Negative for rash.   Allergic/Immunologic: Negative for environmental " allergies and seasonal allergies.   Neurological: Negative for headaches.   Hematologic/Lymphatic: Negative for swollen lymph nodes.       Objective:      Physical Exam   Constitutional: He is oriented to person, place, and time. He appears well-developed. He is cooperative.  Non-toxic appearance. He does not appear ill. No distress.   HENT:   Head: Normocephalic and atraumatic.   Ears:   Right Ear: Hearing, tympanic membrane, external ear and ear canal normal.   Left Ear: Hearing, tympanic membrane, external ear and ear canal normal.   Nose: Nose normal. No mucosal edema, rhinorrhea or nasal deformity. No epistaxis. Right sinus exhibits no maxillary sinus tenderness and no frontal sinus tenderness. Left sinus exhibits no maxillary sinus tenderness and no frontal sinus tenderness.   Mouth/Throat: Uvula is midline, oropharynx is clear and moist and mucous membranes are normal. No trismus in the jaw. Normal dentition. No uvula swelling. No oropharyngeal exudate, posterior oropharyngeal edema or posterior oropharyngeal erythema.   Eyes: Conjunctivae and lids are normal. No scleral icterus.   Neck: Trachea normal, full passive range of motion without pain and phonation normal. Neck supple. No neck rigidity. No edema and no erythema present.   Cardiovascular: Normal rate, regular rhythm, normal heart sounds and normal pulses.   Pulmonary/Chest: Effort normal and breath sounds normal. No respiratory distress. He has no decreased breath sounds. He has no rhonchi.   Abdominal: Normal appearance.   Musculoskeletal: Normal range of motion.         General: No deformity.   Neurological: He is alert and oriented to person, place, and time. He exhibits normal muscle tone. Coordination normal.   Skin: Skin is warm, dry, intact, not diaphoretic and not pale. Psychiatric: His speech is normal and behavior is normal. Judgment and thought content normal.   Nursing note and vitals reviewed.        Assessment:       1. Cough    2.  Viral upper respiratory tract infection        Plan:         Cough  -     POCT COVID-19 Rapid Screening  -     benzonatate (TESSALON PERLES) 100 MG capsule; 1 or 2 every 8 hours prn cough  Dispense: 30 capsule; Refill: 1    Viral upper respiratory tract infection      Patient Instructions     Instructions for Patients with Confirmed or Suspected COVID-19    If you are awaiting your test result, you will either be called or it will be released to the patient portal.  If you have any questions about your test, please visit [email or web address] or call our COVID-19 information line at [phone].      Stay home and stay away from family members and friends. You may leave home and/or return to work when the following conditions are met:   24 hours fever free without fever-reducing medications AND   Improved symptoms     Separate yourself from other people and animals in your home.   Call ahead before visiting your doctor.   Wear a facemask.   Cover your coughs and sneezes.   Wash your hands often with soap and water; hand  can be used, too.   Avoid sharing personal household items.   Wipe down surfaces used daily.   Monitor your symptoms. Seek prompt medical attention if your illness is worsening (e.g., difficulty breathing).    Before seeking care, call your healthcare provider.   If you have a medical emergency and need to call 911, notify the dispatch personnel that you have, or are being evaluated for COVID-19. If possible, put on a facemask before emergency medical services arrive.        Recommended precautions for household members, intimate partners, and caregivers in a home setting of a patient with symptomatic laboratory-confirmed COVID-19 or a patient under investigation.  Household members, intimate partners, and caregivers in the home setting awaiting tests results have close contact with a person with symptomatic, laboratory-confirmed COVID-19 or a person under  investigation. Close contacts should monitor their health; they should call their provider right away if they develop symptoms suggestive of COVID-19 (e.g., fever, cough, shortness of breath).    Close contacts should also follow these recommendations:   Make sure that you understand and can help the patient follow their provider's instructions for medication(s) and care. You should help the patient with basic needs in the home and provide support for getting groceries, prescriptions, and other personal needs.   Monitor the patient's symptoms. If the patient is getting sicker, call his or her healthcare provider and tell them that the patient has laboratory-confirmed COVID-19. If the patient has a medical emergency and you need to call 911, notify the dispatch personnel that the patient has, or is being evaluated for COVID-19.   Household members should stay in another room or be  from the patient. Household members should use a separate bedroom and bathroom, if available.   Prohibit visitors.   Household members should care for any pets in the home.   Make sure that shared spaces in the home have good air flow, such as by an air conditioner or an opened window, weather permitting.   Perform hand hygiene frequently. Wash your hands often with soap and water for at least 20 seconds or use an alcohol-based hand  (that contains > 60% alcohol) covering all surfaces of your hands and rubbing them together until they feel dry. Soap and water should be used preferentially.   Avoid touching your eyes, nose, and mouth.   The patient should wear a facemask. If the patient is not able to wear a facemask (for example, because it causes trouble breathing), caregivers should wear a mask when they are in the same room as the patient.   Wear a disposable facemask and gloves when you touch or have contact with the patient's blood, stool, or body fluids, such as saliva, sputum, nasal mucus, vomit,  urine.  o Throw out disposable facemasks and gloves after using them. Do not reuse.  o When removing personal protective equipment, first remove and dispose of gloves. Then, immediately clean your hands with soap and water or alcohol-based hand . Next, remove and dispose of facemask, and immediately clean your hands again with soap and water or alcohol-based hand .   You should not share dishes, drinking glasses, cups, eating utensils, towels, bedding, or other items with the patient. After the patient uses these items, you should wash them thoroughly (see below Wash laundry thoroughly).   Clean all high-touch surfaces, such as counters, tabletops, doorknobs, bathroom fixtures, toilets, phones, keyboards, tablets, and bedside tables, every day. Also, clean any surfaces that may have blood, stool, or body fluids on them.   Use a household cleaning spray or wipe, according to the label instructions. Labels contain instructions for safe and effective use of the cleaning product including precautions you should take when applying the product, such as wearing gloves and making sure you have good ventilation during use of the product.   Wash laundry thoroughly.  o Immediately remove and wash clothes or bedding that have blood, stool, or body fluids on them.  o Wear disposable gloves while handling soiled items and keep soiled items away from your body. Clean your hands (with soap and water or an alcohol-based hand ) immediately after removing your gloves.  o Read and follow directions on labels of laundry or clothing items and detergent. In general, using a normal laundry detergent according to washing machine instructions and dry thoroughly using the warmest temperatures recommended on the clothing label.   Place all used disposable gloves, facemasks, and other contaminated items in a lined container before disposing of them with other household waste. Clean your hands (with soap and  water or an alcohol-based hand ) immediately after handling these items. Soap and water should be used preferentially if hands are visibly dirty.   Discuss any additional questions with your state or local health department or healthcare provider. Check available hours when contacting your local health department.    For more information see CDC link below.      https://www.cdc.gov/coronavirus/2019-ncov/hcp/guidance-prevent-spread.html#precautions        Sources:  [facility], [place] [facility] and [facility]      Your test was NEGATIVE for COVID-19 (coronavirus).        If your symptoms worsen or if you have any other concerns, please contact [facility] at [phone].     Sincerely,    Susan Moore, DO    Viral Upper Respiratory Illness (Adult)  You have a viral upper respiratory illness (URI), which is another term for the common cold. This illness is contagious during the first few days. It is spread through the air by coughing and sneezing. It may also be spread by direct contact (touching the sick person and then touching your own eyes, nose, or mouth). Frequent handwashing will decrease risk of spread. Most viral illnesses go away within 7 to 10 days with rest and simple home remedies. Sometimes the illness may last for several weeks. Antibiotics will not kill a virus, and they are generally not prescribed for this condition.    Home care  · If symptoms are severe, rest at home for the first 2 to 3 days. When you resume activity, don't let yourself get too tired.  · Avoid being exposed to cigarette smoke (yours or others).  · You may use acetaminophen or ibuprofen to control pain and fever, unless another medicine was prescribed. (Note: If you have chronic liver or kidney disease, have ever had a stomach ulcer or gastrointestinal bleeding, or are taking blood-thinning medicines, talk with your healthcare provider before using these medicines.) Aspirin should never be given to anyone under  18 years of age who is ill with a viral infection or fever. It may cause severe liver or brain damage.  · Your appetite may be poor, so a light diet is fine. Avoid dehydration by drinking 6 to 8 glasses of fluids per day (water, soft drinks, juices, tea, or soup). Extra fluids will help loosen secretions in the nose and lungs.  · Over-the-counter cold medicines will not shorten the length of time youre sick, but they may be helpful for the following symptoms: cough, sore throat, and nasal and sinus congestion. (Note: Do not use decongestants if you have high blood pressure.)  Follow-up care  Follow up with your healthcare provider, or as advised.  When to seek medical advice  Call your healthcare provider right away if any of these occur:  · Cough with lots of colored sputum (mucus)  · Severe headache; face, neck, or ear pain  · Difficulty swallowing due to throat pain  · Fever of 100.4°F (38°C)  Call 911, or get immediate medical care  Call emergency services right away if any of these occur:  · Chest pain, shortness of breath, wheezing, or difficulty breathing  · Coughing up blood  · Inability to swallow due to throat pain  Date Last Reviewed: 9/13/2015  © 2609-4503 [employer]. [address] [place] [address], [place] [address]. All rights reserved. This information is not intended as a substitute for professional medical care. Always follow your healthcare professional's instructions.

## 2020-12-18 ENCOUNTER — OFFICE VISIT (OUTPATIENT)
Dept: URGENT CARE | Facility: CLINIC | Age: 25
End: 2020-12-18
Payer: COMMERCIAL

## 2020-12-18 VITALS
BODY MASS INDEX: 25.77 KG/M2 | WEIGHT: 180 LBS | RESPIRATION RATE: 14 BRPM | TEMPERATURE: 98 F | SYSTOLIC BLOOD PRESSURE: 120 MMHG | HEIGHT: 70 IN | HEART RATE: 104 BPM | OXYGEN SATURATION: 97 % | DIASTOLIC BLOOD PRESSURE: 71 MMHG

## 2020-12-18 DIAGNOSIS — R05.9 COUGH: Primary | ICD-10-CM

## 2020-12-18 LAB
CTP QC/QA: YES
SARS-COV-2 RDRP RESP QL NAA+PROBE: NEGATIVE

## 2020-12-18 PROCEDURE — 71046 XR CHEST PA AND LATERAL: ICD-10-PCS | Mod: FY,S$GLB,, | Performed by: RADIOLOGY

## 2020-12-18 PROCEDURE — 71046 X-RAY EXAM CHEST 2 VIEWS: CPT | Mod: FY,S$GLB,, | Performed by: RADIOLOGY

## 2020-12-18 PROCEDURE — 3008F BODY MASS INDEX DOCD: CPT | Mod: CPTII,S$GLB,, | Performed by: FAMILY MEDICINE

## 2020-12-18 PROCEDURE — U0002: ICD-10-PCS | Mod: QW,S$GLB,, | Performed by: FAMILY MEDICINE

## 2020-12-18 PROCEDURE — 99214 OFFICE O/P EST MOD 30 MIN: CPT | Mod: S$GLB,,, | Performed by: FAMILY MEDICINE

## 2020-12-18 PROCEDURE — 99214 PR OFFICE/OUTPT VISIT, EST, LEVL IV, 30-39 MIN: ICD-10-PCS | Mod: S$GLB,,, | Performed by: FAMILY MEDICINE

## 2020-12-18 PROCEDURE — U0002 COVID-19 LAB TEST NON-CDC: HCPCS | Mod: QW,S$GLB,, | Performed by: FAMILY MEDICINE

## 2020-12-18 PROCEDURE — 3008F PR BODY MASS INDEX (BMI) DOCUMENTED: ICD-10-PCS | Mod: CPTII,S$GLB,, | Performed by: FAMILY MEDICINE

## 2020-12-18 NOTE — PROGRESS NOTES
"Subjective:       Patient ID: Zechariah Potter is a 25 y.o. male.    Vitals:  height is 5' 10" (1.778 m) and weight is 81.6 kg (180 lb). His skin temperature is 97.9 °F (36.6 °C). His blood pressure is 120/71 and his pulse is 104. His respiration is 14 and oxygen saturation is 97%.     Chief Complaint: Cough and COVID-19 Concerns    Pt c/o persistent cough since 11/23/2020    Cough  This is a new problem. The current episode started more than 1 month ago. The problem has been unchanged. The problem occurs constantly. The cough is non-productive. Pertinent negatives include no chest pain, chills, ear pain, eye redness, fever, headaches, hemoptysis, myalgias, rash, sore throat, shortness of breath or wheezing. Nothing aggravates the symptoms. He has tried prescription cough suppressant for the symptoms. The treatment provided mild relief.       Constitution: Negative for chills, sweating, fatigue and fever.   HENT: Negative for ear pain, congestion, sinus pain, sinus pressure, sore throat and voice change.    Neck: Negative for painful lymph nodes.   Cardiovascular: Negative for chest pain and leg swelling.   Eyes: Negative for eye redness, double vision and blurred vision.   Respiratory: Positive for cough. Negative for chest tightness, sputum production, bloody sputum, COPD, shortness of breath, stridor, wheezing and asthma.    Gastrointestinal: Negative for nausea, vomiting and diarrhea.   Genitourinary: Negative for dysuria, frequency and urgency.   Musculoskeletal: Negative for joint pain, joint swelling, muscle cramps and muscle ache.   Skin: Negative for color change, pale and rash.   Allergic/Immunologic: Negative for seasonal allergies and asthma.   Neurological: Negative for dizziness, history of vertigo, light-headedness, passing out and headaches.   Hematologic/Lymphatic: Negative for swollen lymph nodes, easy bruising/bleeding and history of blood clots. Does not bruise/bleed easily. "   Psychiatric/Behavioral: Negative for nervous/anxious, sleep disturbance and depression. The patient is not nervous/anxious.        Objective:      Physical Exam   Constitutional: He does not appear ill. No distress. normal  HENT:   Head: Normocephalic and atraumatic.   Nose: Nose normal.   Mouth/Throat: Mucous membranes are moist. No posterior oropharyngeal erythema.   Eyes: Pupils are equal, round, and reactive to light. extraocular movement intact  Neck: Normal range of motion. Neck supple.   Cardiovascular: Normal rate, regular rhythm, normal heart sounds and normal pulses.   Pulmonary/Chest: Effort normal and breath sounds normal.   Abdominal: Soft. Normal appearance.   Neurological: He is alert.   Nursing note and vitals reviewed.    Results for orders placed or performed in visit on 12/18/20   POCT COVID-19 Rapid Screening   Result Value Ref Range    POC Rapid COVID Negative Negative     Acceptable Yes          Assessment:       1. Cough      chronic unclear etiology. No clear pathology identified. Patient to establish relationship with primary care physician for further evaluation/workup  Plan:         Cough  -     POCT COVID-19 Rapid Screening  -     X-Ray Chest PA And Lateral  -     Ambulatory referral/consult to Internal Medicine    continue with tessalon perles

## 2020-12-18 NOTE — PATIENT INSTRUCTIONS

## 2021-02-19 ENCOUNTER — CLINICAL SUPPORT (OUTPATIENT)
Dept: URGENT CARE | Facility: CLINIC | Age: 26
End: 2021-02-19
Payer: COMMERCIAL

## 2021-02-19 DIAGNOSIS — Z11.9 SCREENING EXAMINATION FOR UNSPECIFIED INFECTIOUS DISEASE: Primary | ICD-10-CM

## 2021-02-19 LAB
CTP QC/QA: YES
SARS-COV-2 RDRP RESP QL NAA+PROBE: NEGATIVE

## 2021-02-19 PROCEDURE — 99211 PR OFFICE/OUTPT VISIT, EST, LEVL I: ICD-10-PCS | Mod: S$GLB,,, | Performed by: PHYSICIAN ASSISTANT

## 2021-02-19 PROCEDURE — 99211 OFF/OP EST MAY X REQ PHY/QHP: CPT | Mod: S$GLB,,, | Performed by: PHYSICIAN ASSISTANT

## 2021-02-19 PROCEDURE — U0002 COVID-19 LAB TEST NON-CDC: HCPCS | Mod: QW,S$GLB,, | Performed by: PHYSICIAN ASSISTANT

## 2021-02-19 PROCEDURE — U0002: ICD-10-PCS | Mod: QW,S$GLB,, | Performed by: PHYSICIAN ASSISTANT

## 2021-02-26 ENCOUNTER — OFFICE VISIT (OUTPATIENT)
Dept: URGENT CARE | Facility: CLINIC | Age: 26
End: 2021-02-26
Payer: COMMERCIAL

## 2021-02-26 VITALS
SYSTOLIC BLOOD PRESSURE: 135 MMHG | RESPIRATION RATE: 18 BRPM | HEIGHT: 71 IN | WEIGHT: 180 LBS | HEART RATE: 100 BPM | BODY MASS INDEX: 25.2 KG/M2 | OXYGEN SATURATION: 97 % | DIASTOLIC BLOOD PRESSURE: 82 MMHG | TEMPERATURE: 98 F

## 2021-02-26 DIAGNOSIS — U07.1 COVID-19: Primary | ICD-10-CM

## 2021-02-26 DIAGNOSIS — R09.81 SINUS CONGESTION: ICD-10-CM

## 2021-02-26 LAB
CTP QC/QA: YES
SARS-COV-2 RDRP RESP QL NAA+PROBE: POSITIVE

## 2021-02-26 PROCEDURE — 99213 PR OFFICE/OUTPT VISIT, EST, LEVL III, 20-29 MIN: ICD-10-PCS | Mod: S$GLB,,, | Performed by: NURSE PRACTITIONER

## 2021-02-26 PROCEDURE — 3008F PR BODY MASS INDEX (BMI) DOCUMENTED: ICD-10-PCS | Mod: CPTII,S$GLB,, | Performed by: NURSE PRACTITIONER

## 2021-02-26 PROCEDURE — 3008F BODY MASS INDEX DOCD: CPT | Mod: CPTII,S$GLB,, | Performed by: NURSE PRACTITIONER

## 2021-02-26 PROCEDURE — 99213 OFFICE O/P EST LOW 20 MIN: CPT | Mod: S$GLB,,, | Performed by: NURSE PRACTITIONER

## 2021-02-26 PROCEDURE — U0002: ICD-10-PCS | Mod: QW,S$GLB,, | Performed by: NURSE PRACTITIONER

## 2021-02-26 PROCEDURE — U0002 COVID-19 LAB TEST NON-CDC: HCPCS | Mod: QW,S$GLB,, | Performed by: NURSE PRACTITIONER

## 2021-06-29 ENCOUNTER — OFFICE VISIT (OUTPATIENT)
Dept: URGENT CARE | Facility: CLINIC | Age: 26
End: 2021-06-29
Payer: COMMERCIAL

## 2021-06-29 VITALS
TEMPERATURE: 98 F | SYSTOLIC BLOOD PRESSURE: 116 MMHG | HEART RATE: 104 BPM | HEIGHT: 71 IN | RESPIRATION RATE: 16 BRPM | BODY MASS INDEX: 25.76 KG/M2 | WEIGHT: 184 LBS | DIASTOLIC BLOOD PRESSURE: 77 MMHG | OXYGEN SATURATION: 95 %

## 2021-06-29 DIAGNOSIS — Z11.9 SCREENING EXAMINATION FOR UNSPECIFIED INFECTIOUS DISEASE: ICD-10-CM

## 2021-06-29 DIAGNOSIS — R11.2 NON-INTRACTABLE VOMITING WITH NAUSEA, UNSPECIFIED VOMITING TYPE: ICD-10-CM

## 2021-06-29 DIAGNOSIS — B34.9 VIRAL SYNDROME: Primary | ICD-10-CM

## 2021-06-29 DIAGNOSIS — R52 BODY ACHES: ICD-10-CM

## 2021-06-29 LAB
CTP QC/QA: YES
CTP QC/QA: YES
FLUAV AG NPH QL: NEGATIVE
FLUBV AG NPH QL: NEGATIVE
SARS-COV-2 RDRP RESP QL NAA+PROBE: NEGATIVE

## 2021-06-29 PROCEDURE — 1126F PR PAIN SEVERITY QUANTIFIED, NO PAIN PRESENT: ICD-10-PCS | Mod: S$GLB,,, | Performed by: FAMILY MEDICINE

## 2021-06-29 PROCEDURE — U0002: ICD-10-PCS | Mod: QW,S$GLB,, | Performed by: FAMILY MEDICINE

## 2021-06-29 PROCEDURE — S0119 PR ONDANSETRON, ORAL, 4MG: ICD-10-PCS | Mod: S$GLB,,, | Performed by: FAMILY MEDICINE

## 2021-06-29 PROCEDURE — S0119 ONDANSETRON 4 MG: HCPCS | Mod: S$GLB,,, | Performed by: FAMILY MEDICINE

## 2021-06-29 PROCEDURE — 99213 OFFICE O/P EST LOW 20 MIN: CPT | Mod: 25,S$GLB,, | Performed by: FAMILY MEDICINE

## 2021-06-29 PROCEDURE — U0002 COVID-19 LAB TEST NON-CDC: HCPCS | Mod: QW,S$GLB,, | Performed by: FAMILY MEDICINE

## 2021-06-29 PROCEDURE — 96372 PR INJECTION,THERAP/PROPH/DIAG2ST, IM OR SUBCUT: ICD-10-PCS | Mod: S$GLB,,, | Performed by: FAMILY MEDICINE

## 2021-06-29 PROCEDURE — 87804 POCT INFLUENZA A/B: ICD-10-PCS | Mod: QW,S$GLB,, | Performed by: FAMILY MEDICINE

## 2021-06-29 PROCEDURE — 96372 THER/PROPH/DIAG INJ SC/IM: CPT | Mod: S$GLB,,, | Performed by: FAMILY MEDICINE

## 2021-06-29 PROCEDURE — 1126F AMNT PAIN NOTED NONE PRSNT: CPT | Mod: S$GLB,,, | Performed by: FAMILY MEDICINE

## 2021-06-29 PROCEDURE — 3008F BODY MASS INDEX DOCD: CPT | Mod: CPTII,S$GLB,, | Performed by: FAMILY MEDICINE

## 2021-06-29 PROCEDURE — 3008F PR BODY MASS INDEX (BMI) DOCUMENTED: ICD-10-PCS | Mod: CPTII,S$GLB,, | Performed by: FAMILY MEDICINE

## 2021-06-29 PROCEDURE — 87804 INFLUENZA ASSAY W/OPTIC: CPT | Mod: QW,S$GLB,, | Performed by: FAMILY MEDICINE

## 2021-06-29 PROCEDURE — 99213 PR OFFICE/OUTPT VISIT, EST, LEVL III, 20-29 MIN: ICD-10-PCS | Mod: 25,S$GLB,, | Performed by: FAMILY MEDICINE

## 2021-06-29 RX ORDER — KETOROLAC TROMETHAMINE 30 MG/ML
30 INJECTION, SOLUTION INTRAMUSCULAR; INTRAVENOUS
Status: COMPLETED | OUTPATIENT
Start: 2021-06-29 | End: 2021-06-29

## 2021-06-29 RX ORDER — ONDANSETRON 4 MG/1
4 TABLET, ORALLY DISINTEGRATING ORAL
Status: COMPLETED | OUTPATIENT
Start: 2021-06-29 | End: 2021-06-29

## 2021-06-29 RX ORDER — ONDANSETRON 4 MG/1
4 TABLET, FILM COATED ORAL DAILY PRN
Qty: 15 TABLET | Refills: 1 | Status: SHIPPED | OUTPATIENT
Start: 2021-06-29 | End: 2021-07-14

## 2021-06-29 RX ADMIN — ONDANSETRON 4 MG: 4 TABLET, ORALLY DISINTEGRATING ORAL at 05:06

## 2021-06-29 RX ADMIN — KETOROLAC TROMETHAMINE 30 MG: 30 INJECTION, SOLUTION INTRAMUSCULAR; INTRAVENOUS at 06:06

## 2021-10-28 ENCOUNTER — TELEPHONE (OUTPATIENT)
Dept: INTERNAL MEDICINE | Facility: CLINIC | Age: 26
End: 2021-10-28
Payer: COMMERCIAL

## 2021-11-02 ENCOUNTER — OFFICE VISIT (OUTPATIENT)
Dept: INTERNAL MEDICINE | Facility: CLINIC | Age: 26
End: 2021-11-02
Payer: COMMERCIAL

## 2021-11-02 VITALS
HEIGHT: 71 IN | BODY MASS INDEX: 25.83 KG/M2 | HEART RATE: 118 BPM | RESPIRATION RATE: 16 BRPM | DIASTOLIC BLOOD PRESSURE: 70 MMHG | SYSTOLIC BLOOD PRESSURE: 122 MMHG | OXYGEN SATURATION: 98 % | WEIGHT: 184.5 LBS

## 2021-11-02 DIAGNOSIS — F90.0 ATTENTION DEFICIT HYPERACTIVITY DISORDER (ADHD), PREDOMINANTLY INATTENTIVE TYPE: ICD-10-CM

## 2021-11-02 DIAGNOSIS — G89.29 CHRONIC NONINTRACTABLE HEADACHE, UNSPECIFIED HEADACHE TYPE: Primary | ICD-10-CM

## 2021-11-02 DIAGNOSIS — R51.9 CHRONIC NONINTRACTABLE HEADACHE, UNSPECIFIED HEADACHE TYPE: Primary | ICD-10-CM

## 2021-11-02 DIAGNOSIS — L70.0 ACNE VULGARIS: ICD-10-CM

## 2021-11-02 DIAGNOSIS — Z78.9 ALCOHOL USE: ICD-10-CM

## 2021-11-02 DIAGNOSIS — F84.0 AUTISM SPECTRUM DISORDER: ICD-10-CM

## 2021-11-02 PROCEDURE — 99999 PR PBB SHADOW E&M-EST. PATIENT-LVL V: ICD-10-PCS | Mod: PBBFAC,,, | Performed by: INTERNAL MEDICINE

## 2021-11-02 PROCEDURE — 99214 PR OFFICE/OUTPT VISIT, EST, LEVL IV, 30-39 MIN: ICD-10-PCS | Mod: S$GLB,,, | Performed by: INTERNAL MEDICINE

## 2021-11-02 PROCEDURE — 99214 OFFICE O/P EST MOD 30 MIN: CPT | Mod: S$GLB,,, | Performed by: INTERNAL MEDICINE

## 2021-11-02 PROCEDURE — 3008F BODY MASS INDEX DOCD: CPT | Mod: CPTII,S$GLB,, | Performed by: INTERNAL MEDICINE

## 2021-11-02 PROCEDURE — 99999 PR PBB SHADOW E&M-EST. PATIENT-LVL V: CPT | Mod: PBBFAC,,, | Performed by: INTERNAL MEDICINE

## 2021-11-02 PROCEDURE — 1159F MED LIST DOCD IN RCRD: CPT | Mod: CPTII,S$GLB,, | Performed by: INTERNAL MEDICINE

## 2021-11-02 PROCEDURE — 3074F SYST BP LT 130 MM HG: CPT | Mod: CPTII,S$GLB,, | Performed by: INTERNAL MEDICINE

## 2021-11-02 PROCEDURE — 3078F PR MOST RECENT DIASTOLIC BLOOD PRESSURE < 80 MM HG: ICD-10-PCS | Mod: CPTII,S$GLB,, | Performed by: INTERNAL MEDICINE

## 2021-11-02 PROCEDURE — 3008F PR BODY MASS INDEX (BMI) DOCUMENTED: ICD-10-PCS | Mod: CPTII,S$GLB,, | Performed by: INTERNAL MEDICINE

## 2021-11-02 PROCEDURE — 3074F PR MOST RECENT SYSTOLIC BLOOD PRESSURE < 130 MM HG: ICD-10-PCS | Mod: CPTII,S$GLB,, | Performed by: INTERNAL MEDICINE

## 2021-11-02 PROCEDURE — 1159F PR MEDICATION LIST DOCUMENTED IN MEDICAL RECORD: ICD-10-PCS | Mod: CPTII,S$GLB,, | Performed by: INTERNAL MEDICINE

## 2021-11-02 PROCEDURE — 3078F DIAST BP <80 MM HG: CPT | Mod: CPTII,S$GLB,, | Performed by: INTERNAL MEDICINE

## 2021-11-02 RX ORDER — DOXYCYCLINE HYCLATE 100 MG
100 TABLET ORAL 2 TIMES DAILY
Qty: 56 TABLET | Refills: 0 | Status: SHIPPED | OUTPATIENT
Start: 2021-11-02 | End: 2021-11-30

## 2021-11-02 RX ORDER — AMITRIPTYLINE HYDROCHLORIDE 25 MG/1
25 TABLET, FILM COATED ORAL NIGHTLY PRN
Qty: 30 TABLET | Refills: 11 | Status: SHIPPED | OUTPATIENT
Start: 2021-11-02 | End: 2023-01-18

## 2021-11-03 ENCOUNTER — LAB VISIT (OUTPATIENT)
Dept: LAB | Facility: HOSPITAL | Age: 26
End: 2021-11-03
Payer: COMMERCIAL

## 2021-11-03 DIAGNOSIS — G89.29 CHRONIC NONINTRACTABLE HEADACHE, UNSPECIFIED HEADACHE TYPE: ICD-10-CM

## 2021-11-03 DIAGNOSIS — R51.9 CHRONIC NONINTRACTABLE HEADACHE, UNSPECIFIED HEADACHE TYPE: ICD-10-CM

## 2021-11-03 LAB
ALBUMIN SERPL BCP-MCNC: 4.1 G/DL (ref 3.5–5.2)
ALP SERPL-CCNC: 50 U/L (ref 55–135)
ALT SERPL W/O P-5'-P-CCNC: 19 U/L (ref 10–44)
ANION GAP SERPL CALC-SCNC: 8 MMOL/L (ref 8–16)
AST SERPL-CCNC: 21 U/L (ref 10–40)
BASOPHILS # BLD AUTO: 0.03 K/UL (ref 0–0.2)
BASOPHILS NFR BLD: 0.7 % (ref 0–1.9)
BILIRUB SERPL-MCNC: 0.5 MG/DL (ref 0.1–1)
BUN SERPL-MCNC: 13 MG/DL (ref 6–20)
CALCIUM SERPL-MCNC: 9.3 MG/DL (ref 8.7–10.5)
CHLORIDE SERPL-SCNC: 104 MMOL/L (ref 95–110)
CHOLEST SERPL-MCNC: 166 MG/DL (ref 120–199)
CHOLEST/HDLC SERPL: 4 {RATIO} (ref 2–5)
CO2 SERPL-SCNC: 28 MMOL/L (ref 23–29)
CREAT SERPL-MCNC: 1 MG/DL (ref 0.5–1.4)
DIFFERENTIAL METHOD: NORMAL
EOSINOPHIL # BLD AUTO: 0.1 K/UL (ref 0–0.5)
EOSINOPHIL NFR BLD: 2.3 % (ref 0–8)
ERYTHROCYTE [DISTWIDTH] IN BLOOD BY AUTOMATED COUNT: 12.3 % (ref 11.5–14.5)
EST. GFR  (AFRICAN AMERICAN): >60 ML/MIN/1.73 M^2
EST. GFR  (NON AFRICAN AMERICAN): >60 ML/MIN/1.73 M^2
GLUCOSE SERPL-MCNC: 86 MG/DL (ref 70–110)
HCT VFR BLD AUTO: 41.9 % (ref 40–54)
HDLC SERPL-MCNC: 42 MG/DL (ref 40–75)
HDLC SERPL: 25.3 % (ref 20–50)
HGB BLD-MCNC: 14.2 G/DL (ref 14–18)
IMM GRANULOCYTES # BLD AUTO: 0.01 K/UL (ref 0–0.04)
IMM GRANULOCYTES NFR BLD AUTO: 0.2 % (ref 0–0.5)
LDLC SERPL CALC-MCNC: 112.2 MG/DL (ref 63–159)
LYMPHOCYTES # BLD AUTO: 1.2 K/UL (ref 1–4.8)
LYMPHOCYTES NFR BLD: 26.1 % (ref 18–48)
MCH RBC QN AUTO: 30.2 PG (ref 27–31)
MCHC RBC AUTO-ENTMCNC: 33.9 G/DL (ref 32–36)
MCV RBC AUTO: 89 FL (ref 82–98)
MONOCYTES # BLD AUTO: 0.3 K/UL (ref 0.3–1)
MONOCYTES NFR BLD: 6.8 % (ref 4–15)
NEUTROPHILS # BLD AUTO: 2.8 K/UL (ref 1.8–7.7)
NEUTROPHILS NFR BLD: 63.9 % (ref 38–73)
NONHDLC SERPL-MCNC: 124 MG/DL
NRBC BLD-RTO: 0 /100 WBC
PLATELET # BLD AUTO: 289 K/UL (ref 150–450)
PMV BLD AUTO: 10.3 FL (ref 9.2–12.9)
POTASSIUM SERPL-SCNC: 4.5 MMOL/L (ref 3.5–5.1)
PROT SERPL-MCNC: 6.8 G/DL (ref 6–8.4)
RBC # BLD AUTO: 4.7 M/UL (ref 4.6–6.2)
SODIUM SERPL-SCNC: 140 MMOL/L (ref 136–145)
TRIGL SERPL-MCNC: 59 MG/DL (ref 30–150)
TSH SERPL DL<=0.005 MIU/L-ACNC: 0.84 UIU/ML (ref 0.4–4)
WBC # BLD AUTO: 4.4 K/UL (ref 3.9–12.7)

## 2021-11-03 PROCEDURE — 36415 COLL VENOUS BLD VENIPUNCTURE: CPT | Performed by: INTERNAL MEDICINE

## 2021-11-03 PROCEDURE — 85025 COMPLETE CBC W/AUTO DIFF WBC: CPT | Performed by: INTERNAL MEDICINE

## 2021-11-03 PROCEDURE — 80061 LIPID PANEL: CPT | Performed by: INTERNAL MEDICINE

## 2021-11-03 PROCEDURE — 80053 COMPREHEN METABOLIC PANEL: CPT | Performed by: INTERNAL MEDICINE

## 2021-11-03 PROCEDURE — 84443 ASSAY THYROID STIM HORMONE: CPT | Performed by: INTERNAL MEDICINE

## 2021-11-15 ENCOUNTER — PATIENT MESSAGE (OUTPATIENT)
Dept: NEUROLOGY | Facility: CLINIC | Age: 26
End: 2021-11-15
Payer: COMMERCIAL

## 2022-01-07 ENCOUNTER — OFFICE VISIT (OUTPATIENT)
Dept: NEUROLOGY | Facility: CLINIC | Age: 27
End: 2022-01-07
Payer: COMMERCIAL

## 2022-01-07 VITALS
WEIGHT: 192 LBS | HEART RATE: 86 BPM | BODY MASS INDEX: 26.88 KG/M2 | HEIGHT: 71 IN | DIASTOLIC BLOOD PRESSURE: 87 MMHG | SYSTOLIC BLOOD PRESSURE: 133 MMHG

## 2022-01-07 DIAGNOSIS — G43.709 CHRONIC MIGRAINE WITHOUT AURA WITHOUT STATUS MIGRAINOSUS, NOT INTRACTABLE: Primary | ICD-10-CM

## 2022-01-07 DIAGNOSIS — G43.719 INTRACTABLE CHRONIC MIGRAINE WITHOUT AURA AND WITHOUT STATUS MIGRAINOSUS: ICD-10-CM

## 2022-01-07 PROCEDURE — 3079F DIAST BP 80-89 MM HG: CPT | Mod: CPTII,S$GLB,, | Performed by: PHYSICIAN ASSISTANT

## 2022-01-07 PROCEDURE — 1160F RVW MEDS BY RX/DR IN RCRD: CPT | Mod: CPTII,S$GLB,, | Performed by: PHYSICIAN ASSISTANT

## 2022-01-07 PROCEDURE — 1159F PR MEDICATION LIST DOCUMENTED IN MEDICAL RECORD: ICD-10-PCS | Mod: CPTII,S$GLB,, | Performed by: PHYSICIAN ASSISTANT

## 2022-01-07 PROCEDURE — 3075F SYST BP GE 130 - 139MM HG: CPT | Mod: CPTII,S$GLB,, | Performed by: PHYSICIAN ASSISTANT

## 2022-01-07 PROCEDURE — 99204 OFFICE O/P NEW MOD 45 MIN: CPT | Mod: S$GLB,,, | Performed by: PHYSICIAN ASSISTANT

## 2022-01-07 PROCEDURE — 3008F PR BODY MASS INDEX (BMI) DOCUMENTED: ICD-10-PCS | Mod: CPTII,S$GLB,, | Performed by: PHYSICIAN ASSISTANT

## 2022-01-07 PROCEDURE — 3008F BODY MASS INDEX DOCD: CPT | Mod: CPTII,S$GLB,, | Performed by: PHYSICIAN ASSISTANT

## 2022-01-07 PROCEDURE — 3079F PR MOST RECENT DIASTOLIC BLOOD PRESSURE 80-89 MM HG: ICD-10-PCS | Mod: CPTII,S$GLB,, | Performed by: PHYSICIAN ASSISTANT

## 2022-01-07 PROCEDURE — 99999 PR PBB SHADOW E&M-EST. PATIENT-LVL III: CPT | Mod: PBBFAC,,, | Performed by: PHYSICIAN ASSISTANT

## 2022-01-07 PROCEDURE — 3075F PR MOST RECENT SYSTOLIC BLOOD PRESS GE 130-139MM HG: ICD-10-PCS | Mod: CPTII,S$GLB,, | Performed by: PHYSICIAN ASSISTANT

## 2022-01-07 PROCEDURE — 99204 PR OFFICE/OUTPT VISIT, NEW, LEVL IV, 45-59 MIN: ICD-10-PCS | Mod: S$GLB,,, | Performed by: PHYSICIAN ASSISTANT

## 2022-01-07 PROCEDURE — 1160F PR REVIEW ALL MEDS BY PRESCRIBER/CLIN PHARMACIST DOCUMENTED: ICD-10-PCS | Mod: CPTII,S$GLB,, | Performed by: PHYSICIAN ASSISTANT

## 2022-01-07 PROCEDURE — 99999 PR PBB SHADOW E&M-EST. PATIENT-LVL III: ICD-10-PCS | Mod: PBBFAC,,, | Performed by: PHYSICIAN ASSISTANT

## 2022-01-07 PROCEDURE — 1159F MED LIST DOCD IN RCRD: CPT | Mod: CPTII,S$GLB,, | Performed by: PHYSICIAN ASSISTANT

## 2022-01-07 RX ORDER — TOPIRAMATE 25 MG/1
25 TABLET ORAL NIGHTLY
Qty: 30 TABLET | Refills: 2 | Status: SHIPPED | OUTPATIENT
Start: 2022-01-07 | End: 2022-02-22 | Stop reason: SDUPTHER

## 2022-01-07 RX ORDER — RIZATRIPTAN BENZOATE 10 MG/1
TABLET, ORALLY DISINTEGRATING ORAL
Qty: 12 TABLET | Refills: 5 | Status: SHIPPED | OUTPATIENT
Start: 2022-01-07 | End: 2022-02-08 | Stop reason: SDUPTHER

## 2022-01-07 RX ORDER — RIZATRIPTAN BENZOATE 10 MG/1
10 TABLET, ORALLY DISINTEGRATING ORAL
Qty: 12 TABLET | Refills: 5 | Status: CANCELLED | OUTPATIENT
Start: 2022-01-07

## 2022-01-07 RX ORDER — PREDNISONE 5 MG/1
TABLET ORAL
Qty: 21 TABLET | Refills: 0 | Status: SHIPPED | OUTPATIENT
Start: 2022-01-07 | End: 2022-01-13

## 2022-01-07 NOTE — PROGRESS NOTES
"  Subjective:       Patient ID: Zechariah Potter is a 26 y.o. male.    Chief Complaint:  No chief complaint on file.      Consultation Requested by:   Joshua Fishman Md  [address]  [place]  [place] [id number]    History of Present Illness  This is a 26 y.o. male w/ pmhx of chiari malformation type on MRI who presents to clinic alone, but (with his mother on the telephone) for evaluation of headaches.  He notes he has suffered from migraines since he was 12 years old, and was followed by a pediatric neurologists, but since has not been followed by neurologist specifically for headaches since 2018. He notes he has a positive family hx of migraines in mother, and great-grandmother. Patient describes his headaches as a constant stabbing pain that is frontally located mainly behind both of his eyes and above his eyebrows. He notes with his headaches he experiences associated dizziness, and presyncopal lightheadedness, nausea, vomiting, and visual changes described as "black outs" but not complete loss of vision. He notes he is experiencing 4 HA days/ week and notes the duration of his headaches can last 4 hours if he Takes medication and it works, which it does not always he notes, but can last as long as 24 + hours if he doesn't take anything, he notes he currently takes sumatriptan 100 mg + 3 ibuprofen's for acute treatment of headaches without relief. He also notes he has tried taking his mother [facility] prescription for acute treatment of headaches, but does not note it to be helpful either.He notes the severity of his headaches at their mildest are a 4/10 and 9/10 at their worst, and when they are a 9/10 he notes he will have to lay down and keep rubbing his eyes and his headaches have caused him to miss significant life events including his college graduation his mother noted.     He notes particular triggers to hisheadaches are Drinking alcohol- notes he drinks occasionally; and sometime he will get a severe " "headache just after 1 glass of wine or beer. Other triggers he noted are exertion and anxiety /stress and notes he see's psychiatry for treatment of anxiety. When questioned regarding presence of aura symptoms patients mother notes there has been times where she has seen her son almost look like he is "blacking out", but he described his aura as a dizzy sensation that occurs sometimes before his headahces but sometimes will occur on its own without a headache.     Patient notes he had an MRI in 2018 for evaluation worsening of headaches and it revealed chiari malformation type 1 and was seen by a NSGY at an outside facility and was told then that there was no need for surgical intervention. I personally reviewed this MRI today with the patient and agree with the findings.     Medications tried and failed: amitriptyline 25 mg, sumatriptan then followed by OTC ibuprofen/Advil       Past Medical History:   Diagnosis Date    Anxiety     Asthma     Headache(784.0)     OCD (obsessive compulsive disorder)     PDD (pervasive developmental disorder)        No past surgical history on file.    Family History   Problem Relation Age of Onset    Migraines Mother        Social History     Socioeconomic History    Marital status: Single   Tobacco Use    Smoking status: Never Smoker    Smokeless tobacco: Never Used   Substance and Sexual Activity    Alcohol use: Yes     Comment: occ    Drug use: No    Sexual activity: Never       Review of Systems  Review of Systems   Constitutional: Negative for activity change, appetite change, chills, fatigue and unexpected weight change.   Eyes: Positive for photophobia. Negative for visual disturbance.   Respiratory: Negative for shortness of breath.    Cardiovascular: Negative for chest pain.   Gastrointestinal: Positive for nausea and vomiting.   Neurological: Positive for dizziness, light-headedness and headaches. Negative for seizures, weakness and numbness. "   Psychiatric/Behavioral: Negative for confusion, decreased concentration and sleep disturbance. The patient is nervous/anxious.        Objective:     Vitals:    01/07/22 1103   BP: 133/87   Pulse: 86      Physical Exam  Vitals reviewed.   Constitutional:       General: He is not in acute distress.     Appearance: Normal appearance. He is well-developed.   HENT:      Head: Normocephalic and atraumatic.      Right Ear: Hearing normal.      Left Ear: Hearing normal.   Eyes:      General: Vision grossly intact. Gaze aligned appropriately.      Extraocular Movements: Extraocular movements intact.      Right eye: Normal extraocular motion and no nystagmus.      Left eye: Normal extraocular motion and no nystagmus.      Conjunctiva/sclera: Conjunctivae normal.      Pupils: Pupils are equal, round, and reactive to light.   Neck:      Vascular: No carotid bruit.   Cardiovascular:      Rate and Rhythm: Normal rate.   Pulmonary:      Effort: Pulmonary effort is normal.   Musculoskeletal:         General: Normal range of motion.      Cervical back: Normal, full passive range of motion without pain, normal range of motion and neck supple. No rigidity or tenderness. No spinous process tenderness or muscular tenderness. Normal range of motion.   Neurological:      General: No focal deficit present.      Mental Status: He is alert and oriented to person, place, and time.      Cranial Nerves: Cranial nerves are intact. No cranial nerve deficit.      Sensory: Sensation is intact. No sensory deficit.      Motor: Motor function is intact. No weakness, tremor, atrophy or abnormal muscle tone.      Coordination: Coordination is intact. Romberg sign negative. Coordination normal. Finger-Nose-Finger Test and Heel to Shin Test normal.      Gait: Gait is intact. Gait normal.      Deep Tendon Reflexes: Strength normal and reflexes are normal and symmetric. Reflexes normal.      Reflex Scores:       Tricep reflexes are 2+ on the right side  and 2+ on the left side.       Bicep reflexes are 2+ on the right side and 2+ on the left side.       Brachioradialis reflexes are 2+ on the right side and 2+ on the left side.       Patellar reflexes are 2+ on the right side and 2+ on the left side.       Achilles reflexes are 2+ on the right side and 2+ on the left side.  Psychiatric:         Behavior: Behavior normal.         Thought Content: Thought content normal.           NEUROLOGICAL EXAMINATION:     MENTAL STATUS   Oriented to person, place, and time.     CRANIAL NERVES   Cranial nerves II through XII intact.     CN II   Visual fields full to confrontation.     CN III, IV, VI   Pupils are equal, round, and reactive to light.    CN V   Facial sensation intact.     CN VII   Facial expression full, symmetric.     CN VIII   CN VIII normal.     CN IX, X   CN IX normal.   CN X normal.     CN XI   CN XI normal.     CN XII   CN XII normal.     MOTOR EXAM   Muscle bulk: normal  Overall muscle tone: normal    Strength   Strength 5/5 throughout.   Right neck flexion: 5/5  Left neck flexion: 5/5  Right neck extension: 5/5  Left neck extension: 5/5  Right deltoid: 5/5  Left deltoid: 5/5  Right biceps: 5/5  Left biceps: 5/5  Right triceps: 5/5  Left triceps: 5/5  Right wrist flexion: 5/5  Left wrist flexion: 5/5  Right wrist extension: 5/5  Left wrist extension: 5/5  Right interossei: 5/5  Left interossei: 5/5  Right abdominals: 5/5  Left abdominals: 5/5  Right iliopsoas: 5/5  Left iliopsoas: 5/5  Right quadriceps: 5/5  Left quadriceps: 5/5  Right hamstrin/5  Left hamstrin/5  Right glutei: 5/5  Left glutei: 5/5  Right anterior tibial: 5/5  Left anterior tibial: 5/5  Right posterior tibial: 5/5  Left posterior tibial: 5/5  Right peroneal: 5/5  Left peroneal: 5/5  Right gastroc: 5/5  Left gastroc: 5/5    REFLEXES     Reflexes   Right brachioradialis: 2+  Left brachioradialis: 2+  Right biceps: 2+  Left biceps: 2+  Right triceps: 2+  Left triceps: 2+  Right  patellar: 2+  Left patellar: 2+  Right achilles: 2+  Left achilles: 2+  Left Alonzo: absent    SENSORY EXAM   Light touch normal.     GAIT AND COORDINATION     Gait  Gait: normal     Coordination   Finger to nose coordination: normal      IMAGING Reviewed:   Results for orders placed or performed during the hospital encounter of 06/18/18   MRI Brain Without Contrast    Narrative    EXAMINATION:  MRI BRAIN WITHOUT CONTRAST    CLINICAL HISTORY:  bilateral headache/dizziness; Headache    TECHNIQUE:  Multiplanar multisequence MR imaging of the brain was performed without contrast.    COMPARISON:  MRI brain 07/09/2012    FINDINGS:  There is no evidence of abnormal restricted diffusion to suggest acute infarction.    There is no acute intracranial hemorrhage, midline shift, or hydrocephalus.  No confluent or focal parenchymal signal abnormalities identified.No extra-axial collections are appreciated.    The sellar region is unremarkable. There is approximately 6-mm of inferior displacement of the cerebellar tonsils through the foramen magnum with associated fullness of the posterior fossa.  The globes and orbits appear unremarkable. The major skull base T2 flow voids are present.      Impression    1.  Approximately 6-mm of inferior displacement of the cerebellar tonsils through the foramen magnum with fullness/crowding of the posterior fossa suggestive of Chiari 1 malformation.    2.  Otherwise, no MRI evidence of acute intracranial abnormality.      Electronically signed by: Eric Goldstein MD  Date:    06/19/2018  Time:    04:14   Results for orders placed or performed during the hospital encounter of 07/09/12   MRI Brain W WO Contrast    Narrative    Ventricular system is normal in size and position with no indication of midline shift or evidence of hydrocephalus.  No significant abnormality of parenchymal brain signal intensity is observed on any of the precontrast pulse sequences generated.  No   evidence of prior  intracranial hemorrhage.  No areas of restricted diffusion.  Following intravenous contrast administration, there is no abnormal intracranial enhancement identified, either relating to the cerebral parenchyma or to the meninges.  No   evidence of neoplasm, vascular malformation, subdural collection, or recent or remote cerebral infarction.  Brainstem and foramen magnum region appear unremarkable.  Paranasal sinuses are clear.    Impression     Cranial MRI examination demonstrates no significant intra-or extraaxial intracranial abnormality.  ______________________________________     Electronically signed by: Magdaleno Loera MD  Date:     07/10/12  Time:    08:17       Note: I have independently reviewed any/all imaging/labs/tests and agree with the report (s) as documented.  Any discrepancies will be as noted/demarcated by free text.  RAYNE WALLIS-C1/7/2022      Assessment/Plan:     Problem List Items Addressed This Visit        Neuro    Headache - Primary (Chronic)    Relevant Medications    predniSONE (DELTASONE) 5 MG tablet    topiramate (TOPAMAX) 25 MG tablet    rizatriptan (MAXALT-MLT) 10 MG disintegrating tablet      Other Visit Diagnoses     Intractable chronic migraine without aura and without status migrainosus              This is a 26 y.o. male  who presents for evaluation and management of chronic migraines since age 12. Discussed symptoms appear to be consistent with chronic migraines, scussed treatment options and patient agreed with the following plan:    Preventative treatment - initiate Topamax 25 mg QHS as an alternative prophylactic migraine medication as amitriptyline has not been helpful at decreasing his migraine occurrence. I discussed side effects of the medications. We will start at a lower dose and can increase as tolerated if no side effects occur.     Rescue medication - initiate trial of Maxalt as an alternative rescue, as he has tried and failed sumatriptan 100 mg, ibuprofen, and has taken his  "mothers Nurtec prescription for acute treatment of his migraine headaches, but did not note particular relief.     I will also initiate short course steroid taper to help break his current headache cycle. I have asked patient to refrain from daily use of OTC medications such as ibuprofen as these can cause medication overuse headache. Discussed medication overuse headache.     Patient education- discussed the importance of decreasing alcohol intake as this seems to be a trigger to his migraine headaches and should not be consumed in conjunction with topamax. discussed practicing good sleep hygiene, minimizing stress/ stress coping techniques, healthy diet, and minimizing caffeine intake to a maximum of 100-200 mg /day. Instructed patient to continue tracking headaches, discussed "[facility]ig[facility]" bill for smart phone headache diary and to look out for particular triggers of headaches.    I will see patient back for follow up in 6 weeks to discuss response to current treatment regimen, at that time if patient fails maxalt 10 mg will consider switching to Ubrelvy 50 mg for abortive medication.     I have discussed realistic goals of care with patient at length as well as medication options, and need for lifestyle adjustment. I have explained that treatment will take time. We have agreed that the goal will be to reduce frequency/intensity/quantity of HA, not to be completely HA free.    The patient verbalizes understanding and agreement with the treatment plan. Questions were sought and answered to patients stated verbal satisfaction.     Emilio Bee PA-C  [facility]  Department of Neurology     Collaborating Physician, Dr. Talley, was available during today's encounter.       "

## 2022-02-03 ENCOUNTER — PATIENT MESSAGE (OUTPATIENT)
Dept: NEUROLOGY | Facility: CLINIC | Age: 27
End: 2022-02-03
Payer: COMMERCIAL

## 2022-02-22 ENCOUNTER — OFFICE VISIT (OUTPATIENT)
Dept: NEUROLOGY | Facility: CLINIC | Age: 27
End: 2022-02-22
Payer: COMMERCIAL

## 2022-02-22 VITALS
SYSTOLIC BLOOD PRESSURE: 124 MMHG | HEIGHT: 71 IN | HEART RATE: 103 BPM | WEIGHT: 183.88 LBS | DIASTOLIC BLOOD PRESSURE: 75 MMHG | BODY MASS INDEX: 25.74 KG/M2

## 2022-02-22 DIAGNOSIS — G43.709 CHRONIC MIGRAINE WITHOUT AURA WITHOUT STATUS MIGRAINOSUS, NOT INTRACTABLE: Primary | ICD-10-CM

## 2022-02-22 PROCEDURE — 99999 PR PBB SHADOW E&M-EST. PATIENT-LVL III: CPT | Mod: PBBFAC,,, | Performed by: PHYSICIAN ASSISTANT

## 2022-02-22 PROCEDURE — 99999 PR PBB SHADOW E&M-EST. PATIENT-LVL III: ICD-10-PCS | Mod: PBBFAC,,, | Performed by: PHYSICIAN ASSISTANT

## 2022-02-22 PROCEDURE — 3078F DIAST BP <80 MM HG: CPT | Mod: CPTII,S$GLB,, | Performed by: PHYSICIAN ASSISTANT

## 2022-02-22 PROCEDURE — 3074F PR MOST RECENT SYSTOLIC BLOOD PRESSURE < 130 MM HG: ICD-10-PCS | Mod: CPTII,S$GLB,, | Performed by: PHYSICIAN ASSISTANT

## 2022-02-22 PROCEDURE — 1159F MED LIST DOCD IN RCRD: CPT | Mod: CPTII,S$GLB,, | Performed by: PHYSICIAN ASSISTANT

## 2022-02-22 PROCEDURE — 99214 OFFICE O/P EST MOD 30 MIN: CPT | Mod: S$GLB,,, | Performed by: PHYSICIAN ASSISTANT

## 2022-02-22 PROCEDURE — 3008F PR BODY MASS INDEX (BMI) DOCUMENTED: ICD-10-PCS | Mod: CPTII,S$GLB,, | Performed by: PHYSICIAN ASSISTANT

## 2022-02-22 PROCEDURE — 3074F SYST BP LT 130 MM HG: CPT | Mod: CPTII,S$GLB,, | Performed by: PHYSICIAN ASSISTANT

## 2022-02-22 PROCEDURE — 3078F PR MOST RECENT DIASTOLIC BLOOD PRESSURE < 80 MM HG: ICD-10-PCS | Mod: CPTII,S$GLB,, | Performed by: PHYSICIAN ASSISTANT

## 2022-02-22 PROCEDURE — 99214 PR OFFICE/OUTPT VISIT, EST, LEVL IV, 30-39 MIN: ICD-10-PCS | Mod: S$GLB,,, | Performed by: PHYSICIAN ASSISTANT

## 2022-02-22 PROCEDURE — 3008F BODY MASS INDEX DOCD: CPT | Mod: CPTII,S$GLB,, | Performed by: PHYSICIAN ASSISTANT

## 2022-02-22 PROCEDURE — 1159F PR MEDICATION LIST DOCUMENTED IN MEDICAL RECORD: ICD-10-PCS | Mod: CPTII,S$GLB,, | Performed by: PHYSICIAN ASSISTANT

## 2022-02-22 RX ORDER — TOPIRAMATE 25 MG/1
50 TABLET ORAL NIGHTLY
Qty: 60 TABLET | Refills: 2 | Status: SHIPPED | OUTPATIENT
Start: 2022-02-22 | End: 2022-03-22 | Stop reason: SDUPTHER

## 2022-02-22 NOTE — PROGRESS NOTES
Established Patient   Subjective:       Patient ID: Zechariah Potter is a 26 y.o. male.  Chief Complaint: Follow-up      Interval History:  Zechariah Potter is a 26 y.o. male w/ phmx of chiari type 1 malformation, chronic migraines with arua, presents to clinic alone for follow up of headaches.   Patient notes his headaches have somewhat improved since I switched patients headache medications from amitriptyline to Topamax for preventative treatment and Maxalt 10 mg for acute headache treatment. He notes since our last visit on 1/7/22 he has seen a decrease in his headache frequency noting he has only had 8/30 Headaches days where as before he was having up to 20/30 HA days a month. He notes when he gets a headache he will take the Maxalt 10 mg and he notes this usually helps to relieve his headache within 30 mins-1 hour, but notes if his headache is very severe then he will have to take a 2nd Maxalt and this usually relieves his headache within 2 hours. He notes he has not taken any ibuprofens since we last spoke and denies experiencing any intolerable side effects to either Topamax or Maxalt medication. He has brought in his headache diary today and we have reviewed this together.     Treatments Tried:   amitriptyline 25 mg, sumatriptan 100mg + ibuprofen (3)       Current Medications:    Current Outpatient Medications:     amitriptyline (ELAVIL) 25 MG tablet, Take 1 tablet (25 mg total) by mouth nightly as needed for Insomnia., Disp: 30 tablet, Rfl: 11    benzonatate (TESSALON PERLES) 100 MG capsule, 1 or 2 every 8 hours prn cough, Disp: 30 capsule, Rfl: 1    dextroamphetamine-amphetamine (ADDERALL XR) 10 MG 24 hr capsule, Take 10 mg by mouth every morning., Disp: , Rfl:     lisdexamfetamine (VYVANSE) 70 MG capsule, Take 70 mg by mouth. 1 Capsule Oral Every morning, Disp: , Rfl:     rizatriptan (MAXALT-MLT) 10 MG disintegrating tablet, Dissolve 1 tab on tongue at onset of migraine, may repeat dose in 2 hours if  needed. Max 3 tabs/day. Max 3 days/week., Disp: 12 tablet, Rfl: 5    sertraline (ZOLOFT) 100 MG tablet, Take 250 mg by mouth once daily. Take two pills daily, Disp: , Rfl:     topiramate (TOPAMAX) 25 MG tablet, Take 2 tablets (50 mg total) by mouth every evening., Disp: 60 tablet, Rfl: 2    ROS: as per HPI, otherwise a balanced 10 systems review is negative.    Objective:     Vitals:    02/22/22 1413   BP: 124/75   Pulse: 103       Physical Exam   Constitutional: he appears well-developed and well-nourished. he is well groomed. NAD   HENT:    Head: Normocephalic and atraumatic. No Tenderness to palpation bilateral cervical paraspinal musculature, suboccipital musculature, negative tinel sign of greater and lesser occipital nerves bilaterally   Eyes: Conjunctivae and EOM are normal  Musculoskeletal: Normal range of motion. No joint stiffness.   Skin: Skin is warm and dry.  Psychiatric: Mood and affect are normal    Neuro: Patient is alert and oriented to person, place, and time. Language is intact and fluent. Speech is clear and fluent. Recent and remote memory are intact.  Normal attention and concentration.  Facial movement is symmetric. Moves all 4 extremities against gravity. Gait and station normal.  Cranial Nerves II through XII without focal deficit.     Focused examination was undertaken today. Most of the visit time was spent giving guidance, counseling and discussing treatment options     Radiological/Laboratory Results Reviewed:   Results for orders placed or performed during the hospital encounter of 06/18/18   MRI Brain Without Contrast    Narrative    EXAMINATION:  MRI BRAIN WITHOUT CONTRAST    CLINICAL HISTORY:  bilateral headache/dizziness; Headache    TECHNIQUE:  Multiplanar multisequence MR imaging of the brain was performed without contrast.    COMPARISON:  MRI brain 07/09/2012    FINDINGS:  There is no evidence of abnormal restricted diffusion to suggest acute infarction.    There is no acute  intracranial hemorrhage, midline shift, or hydrocephalus.  No confluent or focal parenchymal signal abnormalities identified.No extra-axial collections are appreciated.    The sellar region is unremarkable. There is approximately 6-mm of inferior displacement of the cerebellar tonsils through the foramen magnum with associated fullness of the posterior fossa.  The globes and orbits appear unremarkable. The major skull base T2 flow voids are present.      Impression    1.  Approximately 6-mm of inferior displacement of the cerebellar tonsils through the foramen magnum with fullness/crowding of the posterior fossa suggestive of Chiari 1 malformation.    2.  Otherwise, no MRI evidence of acute intracranial abnormality.      Electronically signed by: Eric Goldstein MD  Date:    06/19/2018  Time:    04:14   Results for orders placed or performed during the hospital encounter of 07/09/12   MRI Brain W WO Contrast    Narrative    Ventricular system is normal in size and position with no indication of midline shift or evidence of hydrocephalus.  No significant abnormality of parenchymal brain signal intensity is observed on any of the precontrast pulse sequences generated.  No   evidence of prior intracranial hemorrhage.  No areas of restricted diffusion.  Following intravenous contrast administration, there is no abnormal intracranial enhancement identified, either relating to the cerebral parenchyma or to the meninges.  No   evidence of neoplasm, vascular malformation, subdural collection, or recent or remote cerebral infarction.  Brainstem and foramen magnum region appear unremarkable.  Paranasal sinuses are clear.    Impression     Cranial MRI examination demonstrates no significant intra-or extraaxial intracranial abnormality.  ______________________________________     Electronically signed by: Magdaleno Loera MD  Date:     07/10/12  Time:    08:17        Note: I have independently reviewed any/all imaging/labs/tests and  agree with the report (s) as documented.  Any discrepancies will be as noted/demarcated by free text. GLORIA WALLIS 2/22/2022  Assessment/Plan:     Problem List Items Addressed This Visit    None     Visit Diagnoses     Chronic migraine without aura without status migrainosus, not intractable    -  Primary    Relevant Medications    topiramate (TOPAMAX) 25 MG tablet          Zechariah Potter 26 y.o. male presents for followup of headaches. Discussed symptoms appear to be consistent with chronic migraines with aura. Discussed treatment options and patient agreed with following plan:   Preventative treatment- trial of increase Topamax 50 mg (2 tablets) QHS for attempt at better control of migraine frequency as patient is still experiencing 8/30 HA days per month  Abortive treatment- continue Maxalt 10 mg as needed for treatment of headaches. At next F/U visit will consider trial of ubrelvy 50 mg   Instructed patient to continue tracking headaches.   I will see patient back in 2 months for follow up.      Discussed goals of therapy are to decrease the frequency, intensity, and duration of headaches.  The patient verbalizes understanding and agreement with the treatment plan. I have discussed risks, benefits and alternatives to the treatment plan. Questions were sought and answered to the patients stated verbal satisfaction.      Emilio Bee PA-C  [facility]  Department of Neurology       Collaborating Physician, Dr. Talley, was available during today's encounter.

## 2022-04-07 ENCOUNTER — TELEPHONE (OUTPATIENT)
Dept: NEUROLOGY | Facility: CLINIC | Age: 27
End: 2022-04-07
Payer: COMMERCIAL

## 2022-04-21 ENCOUNTER — OFFICE VISIT (OUTPATIENT)
Dept: NEUROLOGY | Facility: CLINIC | Age: 27
End: 2022-04-21
Payer: COMMERCIAL

## 2022-04-21 VITALS
SYSTOLIC BLOOD PRESSURE: 122 MMHG | DIASTOLIC BLOOD PRESSURE: 74 MMHG | BODY MASS INDEX: 26.41 KG/M2 | HEART RATE: 89 BPM | HEIGHT: 71 IN | WEIGHT: 188.63 LBS

## 2022-04-21 DIAGNOSIS — G43.709 CHRONIC MIGRAINE WITHOUT AURA WITHOUT STATUS MIGRAINOSUS, NOT INTRACTABLE: Primary | ICD-10-CM

## 2022-04-21 PROCEDURE — 3078F DIAST BP <80 MM HG: CPT | Mod: CPTII,S$GLB,, | Performed by: PHYSICIAN ASSISTANT

## 2022-04-21 PROCEDURE — 3074F SYST BP LT 130 MM HG: CPT | Mod: CPTII,S$GLB,, | Performed by: PHYSICIAN ASSISTANT

## 2022-04-21 PROCEDURE — 1159F PR MEDICATION LIST DOCUMENTED IN MEDICAL RECORD: ICD-10-PCS | Mod: CPTII,S$GLB,, | Performed by: PHYSICIAN ASSISTANT

## 2022-04-21 PROCEDURE — 3008F PR BODY MASS INDEX (BMI) DOCUMENTED: ICD-10-PCS | Mod: CPTII,S$GLB,, | Performed by: PHYSICIAN ASSISTANT

## 2022-04-21 PROCEDURE — 1160F PR REVIEW ALL MEDS BY PRESCRIBER/CLIN PHARMACIST DOCUMENTED: ICD-10-PCS | Mod: CPTII,S$GLB,, | Performed by: PHYSICIAN ASSISTANT

## 2022-04-21 PROCEDURE — 1160F RVW MEDS BY RX/DR IN RCRD: CPT | Mod: CPTII,S$GLB,, | Performed by: PHYSICIAN ASSISTANT

## 2022-04-21 PROCEDURE — 3008F BODY MASS INDEX DOCD: CPT | Mod: CPTII,S$GLB,, | Performed by: PHYSICIAN ASSISTANT

## 2022-04-21 PROCEDURE — 99999 PR PBB SHADOW E&M-EST. PATIENT-LVL III: CPT | Mod: PBBFAC,,, | Performed by: PHYSICIAN ASSISTANT

## 2022-04-21 PROCEDURE — 99214 PR OFFICE/OUTPT VISIT, EST, LEVL IV, 30-39 MIN: ICD-10-PCS | Mod: S$GLB,,, | Performed by: PHYSICIAN ASSISTANT

## 2022-04-21 PROCEDURE — 1159F MED LIST DOCD IN RCRD: CPT | Mod: CPTII,S$GLB,, | Performed by: PHYSICIAN ASSISTANT

## 2022-04-21 PROCEDURE — 3078F PR MOST RECENT DIASTOLIC BLOOD PRESSURE < 80 MM HG: ICD-10-PCS | Mod: CPTII,S$GLB,, | Performed by: PHYSICIAN ASSISTANT

## 2022-04-21 PROCEDURE — 99999 PR PBB SHADOW E&M-EST. PATIENT-LVL III: ICD-10-PCS | Mod: PBBFAC,,, | Performed by: PHYSICIAN ASSISTANT

## 2022-04-21 PROCEDURE — 3074F PR MOST RECENT SYSTOLIC BLOOD PRESSURE < 130 MM HG: ICD-10-PCS | Mod: CPTII,S$GLB,, | Performed by: PHYSICIAN ASSISTANT

## 2022-04-21 PROCEDURE — 99214 OFFICE O/P EST MOD 30 MIN: CPT | Mod: S$GLB,,, | Performed by: PHYSICIAN ASSISTANT

## 2022-04-21 RX ORDER — UBROGEPANT 50 MG/1
TABLET ORAL
Qty: 10 TABLET | Refills: 2 | Status: SHIPPED | OUTPATIENT
Start: 2022-04-21 | End: 2022-06-12 | Stop reason: SDUPTHER

## 2022-04-21 NOTE — PROGRESS NOTES
Established Patient   Subjective:       Patient ID: Zechariah Potter is a 26 y.o. male.  Chief Complaint: No chief complaint on file.      Interval History:  Zechariah Potter is a 26 y.o. male with a phmx of chiari type 1 malformation,autism spectrum d/o, and migraines who presents to clinic alone for follow up of headaches. Their condition has improved since our last visit in February and I increased patients dose of topamax to 50 mg to achieve better preventative control of his migraine headaches, which he notes has been helpful at decreasing his headache frequency and notes he had 6/30 HA days in the month of march and 2/30 HA days in the month of April thus far. He notes when he gets a headache the maxalt does take a long time to kick in, and he often finds himself needing more than 1 and wonders if he could have something that could provide releif a little quicker. Otherwise patient is doing well with no new concerns and no side effects since increasing his dose of topamax to 50 mg QHS. He continues to track his headaches and brought his headache diary with him today which we reviewed together.     Treatments Tried: amitriptyline 25 mg, sumatriptan 100mg + ibuprofen (3), maxalt       Current Medications:    Current Outpatient Medications:     amitriptyline (ELAVIL) 25 MG tablet, Take 1 tablet (25 mg total) by mouth nightly as needed for Insomnia., Disp: 30 tablet, Rfl: 11    benzonatate (TESSALON PERLES) 100 MG capsule, 1 or 2 every 8 hours prn cough, Disp: 30 capsule, Rfl: 1    dextroamphetamine-amphetamine (ADDERALL XR) 10 MG 24 hr capsule, Take 10 mg by mouth every morning., Disp: , Rfl:     lisdexamfetamine (VYVANSE) 70 MG capsule, Take 70 mg by mouth. 1 Capsule Oral Every morning, Disp: , Rfl:     sertraline (ZOLOFT) 100 MG tablet, Take 250 mg by mouth once daily. Take two pills daily, Disp: , Rfl:     topiramate (TOPAMAX) 25 MG tablet, Take 2 tablets (50 mg total) by mouth every evening., Disp: 60  tablet, Rfl: 2    ubrogepant (UBROGEPANT) 50 mg tablet, Take 1 50 mg tablet by mouth at onset of headache; if needed a second dose may be taken at least 2 hours after the initial dose; MAX dose 200 mg/24 hr, Disp: 10 tablet, Rfl: 2    ROS: as per HPI, otherwise a balanced 10 systems review is negative.    Objective:     Vitals:    04/21/22 1416   BP: 122/74   Pulse: 89       Physical Exam   Constitutional: he appears well-developed and well-nourished. he is well groomed. NAD   HENT:    Head: Normocephalic and atraumatic. No Tenderness to palpation bilateral cervical paraspinal musculature, suboccipital musculature, negative tinel sign of greater and lesser occipital nerves bilaterally   Eyes: Conjunctivae and EOM are normal  Musculoskeletal: Normal range of motion. No joint stiffness.   Skin: Skin is warm and dry.  Psychiatric: Mood and affect are normal    Neuro: Patient is alert and oriented to person, place, and time. Language is intact and fluent. Speech is clear and fluent. Recent and remote memory are intact.  Normal attention and concentration.  Facial movement is symmetric. Moves all 4 extremities against gravity. Gait and station normal.  Cranial Nerves II through XII without focal deficit.     Focused examination was undertaken today. Most of the visit time was spent giving guidance, counseling and discussing treatment options     Radiological/Laboratory Results Reviewed:   Results for orders placed or performed during the hospital encounter of 06/18/18   MRI Brain Without Contrast    Narrative    EXAMINATION:  MRI BRAIN WITHOUT CONTRAST    CLINICAL HISTORY:  bilateral headache/dizziness; Headache    TECHNIQUE:  Multiplanar multisequence MR imaging of the brain was performed without contrast.    COMPARISON:  MRI brain 07/09/2012    FINDINGS:  There is no evidence of abnormal restricted diffusion to suggest acute infarction.    There is no acute intracranial hemorrhage, midline shift, or hydrocephalus.  No  confluent or focal parenchymal signal abnormalities identified.No extra-axial collections are appreciated.    The sellar region is unremarkable. There is approximately 6-mm of inferior displacement of the cerebellar tonsils through the foramen magnum with associated fullness of the posterior fossa.  The globes and orbits appear unremarkable. The major skull base T2 flow voids are present.      Impression    1.  Approximately 6-mm of inferior displacement of the cerebellar tonsils through the foramen magnum with fullness/crowding of the posterior fossa suggestive of Chiari 1 malformation.    2.  Otherwise, no MRI evidence of acute intracranial abnormality.      Electronically signed by: Eric Goldstein MD  Date:    06/19/2018  Time:    04:14   Results for orders placed or performed during the hospital encounter of 07/09/12   MRI Brain W WO Contrast    Narrative    Ventricular system is normal in size and position with no indication of midline shift or evidence of hydrocephalus.  No significant abnormality of parenchymal brain signal intensity is observed on any of the precontrast pulse sequences generated.  No   evidence of prior intracranial hemorrhage.  No areas of restricted diffusion.  Following intravenous contrast administration, there is no abnormal intracranial enhancement identified, either relating to the cerebral parenchyma or to the meninges.  No   evidence of neoplasm, vascular malformation, subdural collection, or recent or remote cerebral infarction.  Brainstem and foramen magnum region appear unremarkable.  Paranasal sinuses are clear.    Impression     Cranial MRI examination demonstrates no significant intra-or extraaxial intracranial abnormality.  ______________________________________     Electronically signed by: Magdaleno Loera MD  Date:     07/10/12  Time:    08:17        Note: I have independently reviewed any/all imaging/labs/tests and agree with the report (s) as documented.  Any discrepancies  will be as noted/demarcated by free text. GLORIA AWLLIS 4/21/2022  Assessment/Plan:     Problem List Items Addressed This Visit        Neuro    Chronic migraine without aura without status migrainosus, not intractable - Primary    Relevant Medications    ubrogepant (UBROGEPANT) 50 mg tablet          Zechariah Potter 26 y.o. male presents for followup of chronic migraine headaches.  Discussed treatment options and patient agreed with following plan:   Preventative treatment- continue current dose of Topamax 50 mg (2 tablets) QHS as headache frequency is well controlled at this time  Abortive medication- trial of Ubrelvy 50 mg to be used as needed for abortive tx of migraine headaches. It is medically necessary patient be given Ubrelvy for abortive tx of migraine headaches as he has tried and failed sumatriptan, maxalt, and ibuprofen without adequate relief of his migraine headaches. I will send in prior authorization for ubrelvy approval to patients pharmacy.  Continue tracking headaches , discussed to focus on identifying headache triggers and discussed lifestyle modification as adjunctive headache treatment including decreasing caffeine intake and increasing water intake     I will see the patient back for followup in 2 months, at that time will consider switching patients preventative medication from Topamax to Depakote vs.CGRP injectables for attempt at better control of headache frequency.     Discussed goals of therapy are to decrease the frequency, intensity, and duration of headaches.     The patient verbalizes understanding and agreement with the treatment plan. I have discussed risks, benefits and alternatives to the treatment plan. Questions were sought and answered to the patients stated verbal satisfaction.      Emilio Bee PA-C  [facility]  Department of Neurology       Collaborating Physician, Dr. Talley, was available during today's encounter.

## 2022-05-03 ENCOUNTER — PATIENT MESSAGE (OUTPATIENT)
Dept: RESEARCH | Facility: HOSPITAL | Age: 27
End: 2022-05-03
Payer: COMMERCIAL

## 2022-05-19 ENCOUNTER — TELEPHONE (OUTPATIENT)
Dept: NEUROLOGY | Facility: CLINIC | Age: 27
End: 2022-05-19
Payer: COMMERCIAL

## 2022-06-24 ENCOUNTER — OFFICE VISIT (OUTPATIENT)
Dept: NEUROLOGY | Facility: CLINIC | Age: 27
End: 2022-06-24
Payer: COMMERCIAL

## 2022-06-24 DIAGNOSIS — G43.709 CHRONIC MIGRAINE WITHOUT AURA WITHOUT STATUS MIGRAINOSUS, NOT INTRACTABLE: Primary | ICD-10-CM

## 2022-06-24 PROCEDURE — 1160F RVW MEDS BY RX/DR IN RCRD: CPT | Mod: CPTII,95,, | Performed by: PHYSICIAN ASSISTANT

## 2022-06-24 PROCEDURE — 99214 PR OFFICE/OUTPT VISIT, EST, LEVL IV, 30-39 MIN: ICD-10-PCS | Mod: 95,,, | Performed by: PHYSICIAN ASSISTANT

## 2022-06-24 PROCEDURE — 1159F PR MEDICATION LIST DOCUMENTED IN MEDICAL RECORD: ICD-10-PCS | Mod: CPTII,95,, | Performed by: PHYSICIAN ASSISTANT

## 2022-06-24 PROCEDURE — 1160F PR REVIEW ALL MEDS BY PRESCRIBER/CLIN PHARMACIST DOCUMENTED: ICD-10-PCS | Mod: CPTII,95,, | Performed by: PHYSICIAN ASSISTANT

## 2022-06-24 PROCEDURE — 99214 OFFICE O/P EST MOD 30 MIN: CPT | Mod: 95,,, | Performed by: PHYSICIAN ASSISTANT

## 2022-06-24 PROCEDURE — 1159F MED LIST DOCD IN RCRD: CPT | Mod: CPTII,95,, | Performed by: PHYSICIAN ASSISTANT

## 2022-06-24 RX ORDER — UBROGEPANT 50 MG/1
TABLET ORAL
Qty: 10 TABLET | Refills: 2 | Status: CANCELLED | OUTPATIENT
Start: 2022-06-24

## 2022-06-24 RX ORDER — UBROGEPANT 50 MG/1
TABLET ORAL
Qty: 10 TABLET | Refills: 2 | Status: SHIPPED | OUTPATIENT
Start: 2022-06-24 | End: 2022-08-02 | Stop reason: SDUPTHER

## 2022-06-24 NOTE — PROGRESS NOTES
Established Patient     The patient location is: home  The chief complaint leading to consultation is: headache follow up visit    Visit type: audiovisual    Face to Face time with patient: 15 minutes  25 minutes of total time spent on the encounter, which includes face to face time and non-face to face time preparing to see the patient (eg, review of tests), Obtaining and/or reviewing separately obtained history, Documenting clinical information in the electronic or other health record, Independently interpreting results (not separately reported) and communicating results to the patient/family/caregiver, or Care coordination (not separately reported).     Each patient to whom he or she provides medical services by telemedicine is:  (1) informed of the relationship between the physician and patient and the respective role of any other health care provider with respect to management of the patient; and (2) notified that he or she may decline to receive medical services by telemedicine and may withdraw from such care at any time.  Notes:        SUBJECTIVE:  Patient ID: Zechariah Potter   Chief Complaint: Follow-up    History of Present Illness:  Zechariah Potter is a 26 y.o. male  phmx of chiari type 1 malformation,autism spectrum d/o, and migraineswho presents via virtual visit alone for follow-up of headaches.   His headaches are much improved since I last saw him and started him on ubrelvy 50 mg as needed for headache pain relief. He notes the ubrelvy is very helpful at relieving his headaches, and he rarely has to use a 2nd followup dose for completel relief of his migraine headaches. He notes he continues to track his headaches in headache diary and experienced 4/30 HA days in hte month of May, one of which he noted triggers to be drinking alcohol the night before. He notes he is compliant with current dose of topamax 50 mg and notes this to be helpful at preventing his headache frequency. He denies any side  effects to either topamax or ubrelvy. He denies any new changes to his headache and denies focal neurologic deficits.    Treatments Tried:amitriptyline 25 mg, sumatriptan 100mg + ibuprofen (3), maxalt     Review of Systems - as per HPI, otherwise a balanced 10 systems review is negative.    OBJECTIVE:  Vitals:  There were no vitals taken for this visit.   Physical Exam:  Constitutional: he appears well-developed and well-nourished. he is well groomed. NAD   HENT:    Head: Normocephalic and atraumatic  Eyes: Conjunctivae and EOM are normal  Musculoskeletal: Normal range of motion.   Psychiatric: Mood and affect are normal    Neuro: Patient is alert and oriented to person, place, and time. Language is intact and fluent. Speech is clear and fluent. Recent and remote memory are intact.  Normal attention and concentration.  Facial movement is symmetric. Moves all 4 extremities against gravity.      Focused telemedicine examination was undertaken today with certain limitations due to video visit platform. Most of the face to face visit time was spent giving guidance, counseling and discussing treatment options.    Review of Data:   No visits with results within 3 Month(s) from this visit.   Latest known visit with results is:   Lab Visit on 11/03/2021   Component Date Value Ref Range Status    WBC 11/03/2021 4.40  3.90 - 12.70 K/uL Final    RBC 11/03/2021 4.70  4.60 - 6.20 M/uL Final    Hemoglobin 11/03/2021 14.2  14.0 - 18.0 g/dL Final    Hematocrit 11/03/2021 41.9  40.0 - 54.0 % Final    MCV 11/03/2021 89  82 - 98 fL Final    MCH 11/03/2021 30.2  27.0 - 31.0 pg Final    MCHC 11/03/2021 33.9  32.0 - 36.0 g/dL Final    RDW 11/03/2021 12.3  11.5 - 14.5 % Final    Platelets 11/03/2021 289  150 - 450 K/uL Final    MPV 11/03/2021 10.3  9.2 - 12.9 fL Final    Immature Granulocytes 11/03/2021 0.2  0.0 - 0.5 % Final    Gran # (ANC) 11/03/2021 2.8  1.8 - 7.7 K/uL Final    Immature Grans (Abs) 11/03/2021 0.01  0.00 -  0.04 K/uL Final    Lymph # 11/03/2021 1.2  1.0 - 4.8 K/uL Final    Mono # 11/03/2021 0.3  0.3 - 1.0 K/uL Final    Eos # 11/03/2021 0.1  0.0 - 0.5 K/uL Final    Baso # 11/03/2021 0.03  0.00 - 0.20 K/uL Final    nRBC 11/03/2021 0  0 /100 WBC Final    Gran % 11/03/2021 63.9  38.0 - 73.0 % Final    Lymph % 11/03/2021 26.1  18.0 - 48.0 % Final    Mono % 11/03/2021 6.8  4.0 - 15.0 % Final    Eosinophil % 11/03/2021 2.3  0.0 - 8.0 % Final    Basophil % 11/03/2021 0.7  0.0 - 1.9 % Final    Differential Method 11/03/2021 Automated   Final    Sodium 11/03/2021 140  136 - 145 mmol/L Final    Potassium 11/03/2021 4.5  3.5 - 5.1 mmol/L Final    Chloride 11/03/2021 104  95 - 110 mmol/L Final    CO2 11/03/2021 28  23 - 29 mmol/L Final    Glucose 11/03/2021 86  70 - 110 mg/dL Final    BUN 11/03/2021 13  6 - 20 mg/dL Final    Creatinine 11/03/2021 1.0  0.5 - 1.4 mg/dL Final    Calcium 11/03/2021 9.3  8.7 - 10.5 mg/dL Final    Total Protein 11/03/2021 6.8  6.0 - 8.4 g/dL Final    Albumin 11/03/2021 4.1  3.5 - 5.2 g/dL Final    Total Bilirubin 11/03/2021 0.5  0.1 - 1.0 mg/dL Final    Alkaline Phosphatase 11/03/2021 50 (A) 55 - 135 U/L Final    AST 11/03/2021 21  10 - 40 U/L Final    ALT 11/03/2021 19  10 - 44 U/L Final    Anion Gap 11/03/2021 8  8 - 16 mmol/L Final    eGFR if African American 11/03/2021 >60.0  >60 mL/min/1.73 m^2 Final    eGFR if non African American 11/03/2021 >60.0  >60 mL/min/1.73 m^2 Final    TSH 11/03/2021 0.843  0.400 - 4.000 uIU/mL Final    Cholesterol 11/03/2021 166  120 - 199 mg/dL Final    Triglycerides 11/03/2021 59  30 - 150 mg/dL Final    HDL 11/03/2021 42  40 - 75 mg/dL Final    LDL Cholesterol 11/03/2021 112.2  63.0 - 159.0 mg/dL Final    HDL/Cholesterol Ratio 11/03/2021 25.3  20.0 - 50.0 % Final    Total Cholesterol/HDL Ratio 11/03/2021 4.0  2.0 - 5.0 Final    Non-HDL Cholesterol 11/03/2021 124  mg/dL Final     Results for orders placed or performed during the  hospital encounter of 06/18/18   MRI Brain Without Contrast    Narrative    EXAMINATION:  MRI BRAIN WITHOUT CONTRAST    CLINICAL HISTORY:  bilateral headache/dizziness; Headache    TECHNIQUE:  Multiplanar multisequence MR imaging of the brain was performed without contrast.    COMPARISON:  MRI brain 07/09/2012    FINDINGS:  There is no evidence of abnormal restricted diffusion to suggest acute infarction.    There is no acute intracranial hemorrhage, midline shift, or hydrocephalus.  No confluent or focal parenchymal signal abnormalities identified.No extra-axial collections are appreciated.    The sellar region is unremarkable. There is approximately 6-mm of inferior displacement of the cerebellar tonsils through the foramen magnum with associated fullness of the posterior fossa.  The globes and orbits appear unremarkable. The major skull base T2 flow voids are present.      Impression    1.  Approximately 6-mm of inferior displacement of the cerebellar tonsils through the foramen magnum with fullness/crowding of the posterior fossa suggestive of Chiari 1 malformation.    2.  Otherwise, no MRI evidence of acute intracranial abnormality.      Electronically signed by: Eric Goldstein MD  Date:    06/19/2018  Time:    04:14   Results for orders placed or performed during the hospital encounter of 07/09/12   MRI Brain W WO Contrast    Narrative    Ventricular system is normal in size and position with no indication of midline shift or evidence of hydrocephalus.  No significant abnormality of parenchymal brain signal intensity is observed on any of the precontrast pulse sequences generated.  No   evidence of prior intracranial hemorrhage.  No areas of restricted diffusion.  Following intravenous contrast administration, there is no abnormal intracranial enhancement identified, either relating to the cerebral parenchyma or to the meninges.  No   evidence of neoplasm, vascular malformation, subdural collection, or recent  or remote cerebral infarction.  Brainstem and foramen magnum region appear unremarkable.  Paranasal sinuses are clear.    Impression     Cranial MRI examination demonstrates no significant intra-or extraaxial intracranial abnormality.  ______________________________________     Electronically signed by: Magdaleno Loera MD  Date:     07/10/12  Time:    08:17        Note: I have independently reviewed any/all imaging/labs/tests and agree with the report (s) as documented.  Any discrepancies will be as noted/demarcated by free text. GLORIA WALLIS 6/24/2022    Assessment/Plan:     Problem List Items Addressed This Visit        Neuro    Chronic migraine without aura without status migrainosus, not intractable - Primary    Relevant Medications    ubrogepant (UBROGEPANT) 50 mg tablet          Zechariah Potter 26 y.o. male presents for followup of chronic migraine headaches, discussed headaches appear to be well controlled with current treatment regimen of topamax 50 mg QHS and ubrelvy 50 mg as needed, and patient agreed with the following plan:  Preventative medication -continue topamax 50 mg QHS for preventative tx of migraine headaches. Patient denies side effects on this medication, will plan to check metabolic panel at next in person visit.     Abortive medication- continue ubrelvy 50 mg as needed for headache pain relief. Ubrelvy is medically necessary as he has tried and failed sumatriptan, maxalt, and ibuprofen without adequate relief of his migraine headaches. I will send in refill with prior authorization for ubrelvy 50 mg  to patients pharmacy.   Continue tracking headaches     I will see the patient back for followup in 6 months, or sooner if patients symptoms worsen or new symptoms develop.     Discussed goals of therapy are to decrease the frequency, intensity, and duration of headaches  The patient verbalizes understanding and agreement with the treatment plan. I have discussed risks, benefits and alternatives to the  treatment plan. Questions were sought and answered to the patients stated verbal satisfaction.        Emilio Bee PA-C  [facility]  Department of Neurology     Collaborating Physician, Dr. Talley, was available during today's encounter.

## 2022-07-01 ENCOUNTER — TELEPHONE (OUTPATIENT)
Dept: NEUROLOGY | Facility: CLINIC | Age: 27
End: 2022-07-01
Payer: COMMERCIAL

## 2022-07-01 NOTE — TELEPHONE ENCOUNTER
Zechariah Potter (Key: [id number])  Rx #: [id number]  Ubrelvy 50MG tablets     Form  [employer] Electronic PA Form (2017 NCPDP)    CaseId:[id number];Status:Approved;Review Type:Prior Auth;Coverage Start Date:07/01/2022;Coverage End Date:07/01/2023;    Address for PA:   [address] [place] [address]

## 2022-09-16 ENCOUNTER — OFFICE VISIT (OUTPATIENT)
Dept: URGENT CARE | Facility: CLINIC | Age: 27
End: 2022-09-16
Payer: COMMERCIAL

## 2022-09-16 VITALS
DIASTOLIC BLOOD PRESSURE: 76 MMHG | BODY MASS INDEX: 25.76 KG/M2 | TEMPERATURE: 99 F | HEIGHT: 71 IN | HEART RATE: 101 BPM | SYSTOLIC BLOOD PRESSURE: 117 MMHG | RESPIRATION RATE: 16 BRPM | OXYGEN SATURATION: 98 % | WEIGHT: 184 LBS

## 2022-09-16 DIAGNOSIS — J06.9 VIRAL URI WITH COUGH: Primary | ICD-10-CM

## 2022-09-16 DIAGNOSIS — Z11.9 SCREENING EXAMINATION FOR UNSPECIFIED INFECTIOUS DISEASE: ICD-10-CM

## 2022-09-16 LAB
CTP QC/QA: YES
SARS-COV-2 RDRP RESP QL NAA+PROBE: NEGATIVE

## 2022-09-16 PROCEDURE — 1160F PR REVIEW ALL MEDS BY PRESCRIBER/CLIN PHARMACIST DOCUMENTED: ICD-10-PCS | Mod: CPTII,S$GLB,, | Performed by: NURSE PRACTITIONER

## 2022-09-16 PROCEDURE — 99213 PR OFFICE/OUTPT VISIT, EST, LEVL III, 20-29 MIN: ICD-10-PCS | Mod: S$GLB,,, | Performed by: NURSE PRACTITIONER

## 2022-09-16 PROCEDURE — 3078F DIAST BP <80 MM HG: CPT | Mod: CPTII,S$GLB,, | Performed by: NURSE PRACTITIONER

## 2022-09-16 PROCEDURE — 3008F PR BODY MASS INDEX (BMI) DOCUMENTED: ICD-10-PCS | Mod: CPTII,S$GLB,, | Performed by: NURSE PRACTITIONER

## 2022-09-16 PROCEDURE — U0002: ICD-10-PCS | Mod: QW,S$GLB,, | Performed by: NURSE PRACTITIONER

## 2022-09-16 PROCEDURE — 3008F BODY MASS INDEX DOCD: CPT | Mod: CPTII,S$GLB,, | Performed by: NURSE PRACTITIONER

## 2022-09-16 PROCEDURE — 1159F MED LIST DOCD IN RCRD: CPT | Mod: CPTII,S$GLB,, | Performed by: NURSE PRACTITIONER

## 2022-09-16 PROCEDURE — 1160F RVW MEDS BY RX/DR IN RCRD: CPT | Mod: CPTII,S$GLB,, | Performed by: NURSE PRACTITIONER

## 2022-09-16 PROCEDURE — 99213 OFFICE O/P EST LOW 20 MIN: CPT | Mod: S$GLB,,, | Performed by: NURSE PRACTITIONER

## 2022-09-16 PROCEDURE — 3074F PR MOST RECENT SYSTOLIC BLOOD PRESSURE < 130 MM HG: ICD-10-PCS | Mod: CPTII,S$GLB,, | Performed by: NURSE PRACTITIONER

## 2022-09-16 PROCEDURE — 1159F PR MEDICATION LIST DOCUMENTED IN MEDICAL RECORD: ICD-10-PCS | Mod: CPTII,S$GLB,, | Performed by: NURSE PRACTITIONER

## 2022-09-16 PROCEDURE — 3078F PR MOST RECENT DIASTOLIC BLOOD PRESSURE < 80 MM HG: ICD-10-PCS | Mod: CPTII,S$GLB,, | Performed by: NURSE PRACTITIONER

## 2022-09-16 PROCEDURE — U0002 COVID-19 LAB TEST NON-CDC: HCPCS | Mod: QW,S$GLB,, | Performed by: NURSE PRACTITIONER

## 2022-09-16 PROCEDURE — 3074F SYST BP LT 130 MM HG: CPT | Mod: CPTII,S$GLB,, | Performed by: NURSE PRACTITIONER

## 2022-09-16 RX ORDER — BENZONATATE 100 MG/1
200 CAPSULE ORAL 3 TIMES DAILY PRN
Qty: 20 CAPSULE | Refills: 0 | Status: SHIPPED | OUTPATIENT
Start: 2022-09-16

## 2022-09-16 RX ORDER — FLUTICASONE PROPIONATE 50 MCG
1 SPRAY, SUSPENSION (ML) NASAL
COMMUNITY
Start: 2022-01-18 | End: 2023-01-18

## 2022-09-16 RX ORDER — CLINDAMYCIN PHOSPHATE 11.9 MG/ML
SOLUTION TOPICAL
COMMUNITY
Start: 2022-05-18

## 2022-09-16 NOTE — PROGRESS NOTES
"Subjective:       Patient ID: Zechariah Potter is a 26 y.o. male.    Vitals:  height is 5' 11" (1.803 m) and weight is 83.5 kg (184 lb). His temperature is 98.9 °F (37.2 °C). His blood pressure is 117/76 and his pulse is 101. His respiration is 16 and oxygen saturation is 98%.     Chief Complaint: Cough (/)    Patient having  congestion, cough ,sore throat  patient tested for covid last Tuesday with negative results .     Cough  This is a new problem. The current episode started in the past 7 days. The problem has been gradually worsening. The cough is Productive of sputum. Associated symptoms include nasal congestion and a sore throat. Pertinent negatives include no chest pain, chills, ear congestion, ear pain, fever, headaches, heartburn, hemoptysis, myalgias, postnasal drip, rash, rhinorrhea, shortness of breath, sweats, weight loss or wheezing. Nothing aggravates the symptoms.     Constitution: Negative for chills and fever.   HENT:  Positive for sore throat. Negative for ear pain and postnasal drip.    Cardiovascular:  Negative for chest pain.   Respiratory:  Positive for cough. Negative for bloody sputum, shortness of breath and wheezing.    Gastrointestinal:  Negative for heartburn.   Musculoskeletal:  Negative for muscle ache.   Skin:  Negative for rash.   Neurological:  Negative for headaches.     Objective:      Physical Exam   Constitutional: He is oriented to person, place, and time.   HENT:   Head: Normocephalic and atraumatic.   Ears:   Right Ear: Tympanic membrane, external ear and ear canal normal.   Left Ear: Tympanic membrane, external ear and ear canal normal.   Nose: No rhinorrhea or congestion.   Mouth/Throat: No oropharyngeal exudate or posterior oropharyngeal erythema.   Cardiovascular: Normal rate, regular rhythm and normal heart sounds.   Pulmonary/Chest: Effort normal and breath sounds normal. No stridor. No respiratory distress. He has no wheezes. He has no rhonchi. He has no rales. He " exhibits no tenderness.   Abdominal: Normal appearance.   Neurological: He is alert and oriented to person, place, and time.   Skin: Skin is dry.   Psychiatric: His behavior is normal. Mood normal.         Results for orders placed or performed in visit on 09/16/22   POCT COVID-19 Rapid Screening   Result Value Ref Range    POC Rapid COVID Negative Negative     Acceptable Yes       Assessment:       1. Viral URI with cough    2. Screening examination for unspecified infectious disease          Plan:       Covid test negative  Tessalon Perles for cough  Return to clinic if symptoms worsen or do not improve        Viral URI with cough  -     benzonatate (TESSALON PERLES) 100 MG capsule; Take 2 capsules (200 mg total) by mouth 3 (three) times daily as needed for Cough.  Dispense: 20 capsule; Refill: 0    Screening examination for unspecified infectious disease  -     POCT COVID-19 Rapid Screening

## 2022-10-31 ENCOUNTER — OFFICE VISIT (OUTPATIENT)
Dept: OTOLARYNGOLOGY | Facility: CLINIC | Age: 27
End: 2022-10-31
Payer: COMMERCIAL

## 2022-10-31 VITALS — DIASTOLIC BLOOD PRESSURE: 78 MMHG | TEMPERATURE: 98 F | SYSTOLIC BLOOD PRESSURE: 124 MMHG | HEART RATE: 81 BPM

## 2022-10-31 DIAGNOSIS — R09.A2 GLOBUS SENSATION: ICD-10-CM

## 2022-10-31 DIAGNOSIS — R05.9 COUGH, UNSPECIFIED TYPE: Primary | ICD-10-CM

## 2022-10-31 PROCEDURE — 3074F SYST BP LT 130 MM HG: CPT | Mod: CPTII,S$GLB,, | Performed by: OTOLARYNGOLOGY

## 2022-10-31 PROCEDURE — 3078F DIAST BP <80 MM HG: CPT | Mod: CPTII,S$GLB,, | Performed by: OTOLARYNGOLOGY

## 2022-10-31 PROCEDURE — 1159F MED LIST DOCD IN RCRD: CPT | Mod: CPTII,S$GLB,, | Performed by: OTOLARYNGOLOGY

## 2022-10-31 PROCEDURE — 3078F PR MOST RECENT DIASTOLIC BLOOD PRESSURE < 80 MM HG: ICD-10-PCS | Mod: CPTII,S$GLB,, | Performed by: OTOLARYNGOLOGY

## 2022-10-31 PROCEDURE — 99204 PR OFFICE/OUTPT VISIT, NEW, LEVL IV, 45-59 MIN: ICD-10-PCS | Mod: S$GLB,,, | Performed by: OTOLARYNGOLOGY

## 2022-10-31 PROCEDURE — 3074F PR MOST RECENT SYSTOLIC BLOOD PRESSURE < 130 MM HG: ICD-10-PCS | Mod: CPTII,S$GLB,, | Performed by: OTOLARYNGOLOGY

## 2022-10-31 PROCEDURE — 1160F PR REVIEW ALL MEDS BY PRESCRIBER/CLIN PHARMACIST DOCUMENTED: ICD-10-PCS | Mod: CPTII,S$GLB,, | Performed by: OTOLARYNGOLOGY

## 2022-10-31 PROCEDURE — 99204 OFFICE O/P NEW MOD 45 MIN: CPT | Mod: S$GLB,,, | Performed by: OTOLARYNGOLOGY

## 2022-10-31 PROCEDURE — 1159F PR MEDICATION LIST DOCUMENTED IN MEDICAL RECORD: ICD-10-PCS | Mod: CPTII,S$GLB,, | Performed by: OTOLARYNGOLOGY

## 2022-10-31 PROCEDURE — 1160F RVW MEDS BY RX/DR IN RCRD: CPT | Mod: CPTII,S$GLB,, | Performed by: OTOLARYNGOLOGY

## 2022-10-31 NOTE — PROGRESS NOTES
Mr. Potter     Vitals:    10/31/22 0942   BP: 124/78   Pulse: 81   Temp: 97.7 °F (36.5 °C)       Chief Complaint:  Cough       HPI:   is a 26-year-old white male who presents with complaint of cough for the last 5 or 6 weeks.  He does state that it has recently gotten somewhat better.  This is always been nonproductive.  He feels that it began when he was eating a baked potato that was too hot and he swallowed a piece causing some pain in his throat.  He is not had any airway issues or hoarseness.  His cough is nonproductive and he has had no purulent discharge from his nose.  He denies any history of chronic recurrent sinus disease.  He does describe some occasional globus type sensation.  He does complain of some hearing loss and does have a history of PE tubes at age 3 and tonsillectomy at age 5.    SNOT22- 56 NOSE- 30    Review of Systems:  Constitutional:   weight loss or weight gain: Negative  Allergy/Immunologic:   Negative  Nasal Congestion/Obstruction:   Negative  Nosebleeds:   Negative  Sinus infections:   Negative  Headache/Facial Pain:   Negative  Snoring/MATEUS:   Negative  Throat: Infections/Pain:   Negative  Hoarseness/Speech Disturbance:   Negative  Trauma Hx:  Negative    Cardiovascular:  M/I Angina: Negative  Hypertension: Negative  Endocrine:    DM/Steroids: Negative  GI:   Dysphagia/Reflux: Negative  :   GYN Pregnancy: Negative  Renal:   Dialysis: Negative  Lymphatic:   Neck Mass/Lymphadenopathy: Negative  Muscoloskeletal:   Negative  Hematologic:   Bleeding Disorders/Anemia: Negative  Neurologic:    Cranial/Neuralgia: Negative  Pulmonary:   Asthma/SOB/Cough: Negative  Skin Disorders: Negative    Past Medical/Surgical/Family/Social History:    ENT Surgery: Negative  Occupational Exposure: Negative   Problems: Negative  Cancer: Negative    Past Family History:   Family history of Cancer: Negative    Past Social History:   Tobacco: Nonsmoker   Alcohol: Social Drinker      Allergies  and medications: Reviewed per med card.    Physical Examination:  Ears:   External auditory canals:  Clear   Hearing: Grossly intact   Tympanic Membranes: Clear  Nose:   External: Normal with good valve support.   Intranasal:  Slight inferiorly based septal spur on the left otherwise remaining septum straight.  Turbinates 1 to 2+, nasal airway clear at this time.  Mouth:   Intraorally: Lips, teeth, and gums: Normal   Oropharynx: Normal   Mucosa: Normal   Tongue: Normal  Throat:      Palate: Normal palate with elevation, Mallampati 1.   Tonsils:  Absent   Posterior Pharynx: Normal  Fiberoptic exam: Not performed  Head/Face:     Inspection: Normal and atraumatic   Palpation/Percussion: Non tender   Facial strength: Normal and symmetric   Salivary glands: Normal  Neck: Supple  Thyroid: No masses  Lymphatics: No nodes  Respiratory:   Effort: Normal  Eyes:   Ocular Mobility: Normal   Vision: Grossly intact  Neuro/Psych:   Cranial Nerves: Grossly Intact   Orientation: Normal   Mood/Affect: Normal      Assessment/Plan:  I have discussed my findings with him in detail as well as my recommendations for treatment.  We have discussed reflux precautions.  I have also given him literature on saline sinus rinses including issues with distilled water only described this to be used in detail.  I will refer him for an audiogram and evaluation by 1 of our ear specialist.  He will return to clinic here p.r.n.

## 2023-01-18 ENCOUNTER — OFFICE VISIT (OUTPATIENT)
Dept: INTERNAL MEDICINE | Facility: CLINIC | Age: 28
End: 2023-01-18

## 2023-01-18 VITALS
HEIGHT: 71 IN | DIASTOLIC BLOOD PRESSURE: 82 MMHG | BODY MASS INDEX: 28.45 KG/M2 | HEART RATE: 72 BPM | WEIGHT: 203.25 LBS | SYSTOLIC BLOOD PRESSURE: 114 MMHG | OXYGEN SATURATION: 95 %

## 2023-01-18 DIAGNOSIS — G43.709 CHRONIC MIGRAINE WITHOUT AURA WITHOUT STATUS MIGRAINOSUS, NOT INTRACTABLE: ICD-10-CM

## 2023-01-18 DIAGNOSIS — F84.0 AUTISM SPECTRUM DISORDER: ICD-10-CM

## 2023-01-18 DIAGNOSIS — F90.0 ATTENTION DEFICIT HYPERACTIVITY DISORDER (ADHD), PREDOMINANTLY INATTENTIVE TYPE: ICD-10-CM

## 2023-01-18 DIAGNOSIS — Z78.9 ALCOHOL USE: ICD-10-CM

## 2023-01-18 DIAGNOSIS — Z00.00 LABORATORY EXAMINATION ORDERED AS PART OF A COMPLETE PHYSICAL EXAMINATION: ICD-10-CM

## 2023-01-18 DIAGNOSIS — Z00.00 ENCOUNTER FOR ANNUAL PHYSICAL EXAM: Primary | ICD-10-CM

## 2023-01-18 PROCEDURE — 99999 PR PBB SHADOW E&M-EST. PATIENT-LVL IV: CPT | Mod: PBBFAC,,, | Performed by: INTERNAL MEDICINE

## 2023-01-18 PROCEDURE — 99395 PR PREVENTIVE VISIT,EST,18-39: ICD-10-PCS | Mod: S$PBB,,, | Performed by: INTERNAL MEDICINE

## 2023-01-18 PROCEDURE — 99999 PR PBB SHADOW E&M-EST. PATIENT-LVL IV: ICD-10-PCS | Mod: PBBFAC,,, | Performed by: INTERNAL MEDICINE

## 2023-01-18 PROCEDURE — 90715 TDAP VACCINE 7 YRS/> IM: CPT | Mod: PBBFAC

## 2023-01-18 PROCEDURE — 99395 PREV VISIT EST AGE 18-39: CPT | Mod: S$PBB,,, | Performed by: INTERNAL MEDICINE

## 2023-01-18 PROCEDURE — 90471 IMMUNIZATION ADMIN: CPT | Mod: PBBFAC

## 2023-01-18 RX ORDER — PREDNISONE 20 MG/1
20 TABLET ORAL 2 TIMES DAILY
COMMUNITY
Start: 2022-10-03

## 2023-01-18 RX ORDER — UBROGEPANT 50 MG/1
TABLET ORAL
Qty: 10 TABLET | Refills: 2 | Status: SHIPPED | OUTPATIENT
Start: 2023-01-18 | End: 2023-04-10 | Stop reason: SDUPTHER

## 2023-01-18 NOTE — PROGRESS NOTES
Subjective:       Patient ID: Zechariah Potter is a 27 y.o. male.    Chief Complaint: Follow-up      HPI  Zechariah Potter is a 27 y.o. year old male with chronic migraines (followed previously Neurology), alcohol induced headaches, alcohol use, anxiety, autism spectrum disorder, OCD presents for annual exam. Lost to follow up with neurology.    Review of Systems   Constitutional:  Negative for activity change, appetite change, chills, fatigue, fever and unexpected weight change.   HENT:  Negative for congestion, rhinorrhea and sore throat.    Eyes:  Negative for visual disturbance.   Respiratory:  Negative for shortness of breath.    Cardiovascular:  Negative for chest pain.   Gastrointestinal:  Negative for abdominal pain, diarrhea, nausea and vomiting.   Genitourinary:  Negative for difficulty urinating and dysuria.   Musculoskeletal:  Negative for arthralgias, back pain and myalgias.   Skin:  Negative for color change and rash.   Neurological:  Positive for headaches. Negative for dizziness and weakness.       Past Medical History:   Diagnosis Date    Anxiety     Asthma     Headache(784.0)     OCD (obsessive compulsive disorder)     PDD (pervasive developmental disorder)         Prior to Admission medications    Medication Sig Start Date End Date Taking? Authorizing Provider   benzonatate (TESSALON PERLES) 100 MG capsule 1 or 2 every 8 hours prn cough 20  Yes Susan Moore,    benzonatate (TESSALON PERLES) 100 MG capsule Take 2 capsules (200 mg total) by mouth 3 (three) times daily as needed for Cough. 22  Yes Katheryn Man, MILTON   clindamycin (CLEOCIN T) 1 % external solution SMARTSI Topical Twice Daily 22  Yes Historical Provider   dextroamphetamine-amphetamine (ADDERALL XR) 10 MG 24 hr capsule Take 10 mg by mouth every morning.   Yes Historical Provider   fluticasone propionate (FLONASE) 50 mcg/actuation nasal spray 1 spray by Nasal route. 22 Yes Historical Provider  "  lisdexamfetamine (VYVANSE) 70 MG capsule Take 70 mg by mouth. 1 Capsule Oral Every morning   Yes Historical Provider   sertraline (ZOLOFT) 100 MG tablet Take 250 mg by mouth once daily. Take two pills daily   Yes Historical Provider   ubrogepant (UBRELVY) 50 mg tablet Take 1 50 mg tablet by mouth at onset of headache; if needed a second dose may be taken at least 2 hours after the initial dose; MAX dose 200 mg/24 hr 8/3/22  Yes Emilio Bee PA-C   predniSONE (DELTASONE) 20 MG tablet Take 20 mg by mouth 2 (two) times daily. 10/3/22   Historical Provider   topiramate (TOPAMAX) 25 MG tablet Take 2 tablets (50 mg total) by mouth every evening. 9/21/22 12/20/22  Baljit Talley III, MD   amitriptyline (ELAVIL) 25 MG tablet Take 1 tablet (25 mg total) by mouth nightly as needed for Insomnia. 11/2/21 1/18/23  Joshua Fishman MD        Past medical history, surgical history, and family medical history reviewed and updated as appropriate.    Medications and allergies reviewed.     Objective:          Vitals:    01/18/23 1605   BP: 114/82   BP Location: Right arm   Patient Position: Sitting   Pulse: 72   SpO2: 95%   Weight: 92.2 kg (203 lb 4.2 oz)   Height: 5' 11" (1.803 m)     Body mass index is 28.35 kg/m².  Physical Exam  Constitutional:       General: He is not in acute distress.     Appearance: He is well-developed.   HENT:      Head: Normocephalic and atraumatic.      Nose: Nose normal.   Eyes:      General: No scleral icterus.     Extraocular Movements: Extraocular movements intact.   Neck:      Thyroid: No thyromegaly.      Vascular: No JVD.      Trachea: No tracheal deviation.   Cardiovascular:      Rate and Rhythm: Normal rate and regular rhythm.      Heart sounds: Normal heart sounds. No murmur heard.    No friction rub. No gallop.   Pulmonary:      Effort: Pulmonary effort is normal. No respiratory distress.      Breath sounds: Normal breath sounds. No wheezing or rales.   Abdominal:      General: Bowel sounds are " normal. There is no distension.      Palpations: Abdomen is soft. There is no mass.      Tenderness: There is no abdominal tenderness.   Musculoskeletal:         General: No tenderness. Normal range of motion.      Cervical back: Normal range of motion and neck supple.   Lymphadenopathy:      Cervical: No cervical adenopathy.   Skin:     General: Skin is warm and dry.      Capillary Refill: Capillary refill takes less than 2 seconds.      Findings: No rash.   Neurological:      General: No focal deficit present.      Mental Status: He is alert and oriented to person, place, and time.      Cranial Nerves: No cranial nerve deficit.      Deep Tendon Reflexes: Reflexes normal.   Psychiatric:         Mood and Affect: Mood normal.         Behavior: Behavior normal.       Lab Results   Component Value Date    WBC 4.40 11/03/2021    HGB 14.2 11/03/2021    HCT 41.9 11/03/2021     11/03/2021    CHOL 166 11/03/2021    TRIG 59 11/03/2021    HDL 42 11/03/2021    ALT 19 11/03/2021    AST 21 11/03/2021     11/03/2021    K 4.5 11/03/2021     11/03/2021    CREATININE 1.0 11/03/2021    BUN 13 11/03/2021    CO2 28 11/03/2021    TSH 0.843 11/03/2021       Assessment:       1. Encounter for annual physical exam    2. Chronic migraine without aura without status migrainosus, not intractable    3. Attention deficit hyperactivity disorder (ADHD), predominantly inattentive type    4. Autism spectrum disorder    5. Alcohol use    6. Laboratory examination ordered as part of a complete physical examination    7. Chronic migraine without aura without status migrainosus, not intractable          Plan:     Zechariah was seen today for follow-up.    Diagnoses and all orders for this visit:    Encounter for annual physical exam    Chronic migraine without aura without status migrainosus, not intractable  Comments:  getting headaches at least once a week, uses ubrelvy and nurtec; is still taking topamax 50 mg every evening. takes  ubrelvy / nurtec prn  Orders:  -     Ambulatory referral/consult to Neurology; Future  -     ubrogepant (UBRELVY) 50 mg tablet; Take 1 50 mg tablet by mouth at onset of headache; if needed a second dose may be taken at least 2 hours after the initial dose; MAX dose 200 mg/24 hr    Attention deficit hyperactivity disorder (ADHD), predominantly inattentive type    Autism spectrum disorder  -     Ambulatory referral/consult to Neurology; Future    Alcohol use    Laboratory examination ordered as part of a complete physical examination  -     CBC Auto Differential; Future  -     Comprehensive Metabolic Panel; Future  -     TSH; Future  -     Hemoglobin A1C; Future  -     Lipid Panel; Future    Chronic migraine without aura without status migrainosus, not intractable  -     Ambulatory referral/consult to Neurology; Future  -     ubrogepant (UBRELVY) 50 mg tablet; Take 1 50 mg tablet by mouth at onset of headache; if needed a second dose may be taken at least 2 hours after the initial dose; MAX dose 200 mg/24 hr    Benign physical examination, no issues identified. Will obtain routine labwork and age appropriate health screenings.     Health maintenance reviewed with patient. Patient is due for tdap.     Follow up in about 1 year (around 1/18/2024).    Joshua Fishman MD  Internal Medicine / Primary Care  [facility]  1/18/2023

## 2023-01-18 NOTE — PATIENT INSTRUCTIONS
Tetanus shot today (Tdap)  Schedule neurology appoint  Schedule fasting labwork.  Return to clinic in 1 year or sooner if needed.   Follow up with psychiatry (Dr. Hong Cruz) as scheduled.    Courtesy refill of your ubrelvy sent to your pharmacy.

## 2023-01-18 NOTE — PROGRESS NOTES
After obtaining consent, and per orders of Dr. Joshua Fishman, injection of Tdap given by Carly Larry on the left deltoid. Patient instructed to remain in clinic for 15 minutes afterwards, and to report any adverse reaction to me immediately.

## 2023-01-24 ENCOUNTER — LAB VISIT (OUTPATIENT)
Dept: LAB | Facility: HOSPITAL | Age: 28
End: 2023-01-24
Attending: INTERNAL MEDICINE
Payer: COMMERCIAL

## 2023-01-24 DIAGNOSIS — Z00.00 LABORATORY EXAMINATION ORDERED AS PART OF A COMPLETE PHYSICAL EXAMINATION: ICD-10-CM

## 2023-01-24 LAB
ALBUMIN SERPL BCP-MCNC: 4.4 G/DL (ref 3.5–5.2)
ALP SERPL-CCNC: 50 U/L (ref 55–135)
ALT SERPL W/O P-5'-P-CCNC: 24 U/L (ref 10–44)
ANION GAP SERPL CALC-SCNC: 10 MMOL/L (ref 8–16)
AST SERPL-CCNC: 22 U/L (ref 10–40)
BASOPHILS # BLD AUTO: 0.04 K/UL (ref 0–0.2)
BASOPHILS NFR BLD: 0.6 % (ref 0–1.9)
BILIRUB SERPL-MCNC: 0.5 MG/DL (ref 0.1–1)
BUN SERPL-MCNC: 15 MG/DL (ref 6–20)
CALCIUM SERPL-MCNC: 9.9 MG/DL (ref 8.7–10.5)
CHLORIDE SERPL-SCNC: 105 MMOL/L (ref 95–110)
CHOLEST SERPL-MCNC: 212 MG/DL (ref 120–199)
CHOLEST/HDLC SERPL: 4.6 {RATIO} (ref 2–5)
CO2 SERPL-SCNC: 25 MMOL/L (ref 23–29)
CREAT SERPL-MCNC: 0.9 MG/DL (ref 0.5–1.4)
DIFFERENTIAL METHOD: NORMAL
EOSINOPHIL # BLD AUTO: 0.2 K/UL (ref 0–0.5)
EOSINOPHIL NFR BLD: 2.4 % (ref 0–8)
ERYTHROCYTE [DISTWIDTH] IN BLOOD BY AUTOMATED COUNT: 12.5 % (ref 11.5–14.5)
EST. GFR  (NO RACE VARIABLE): >60 ML/MIN/1.73 M^2
ESTIMATED AVG GLUCOSE: 97 MG/DL (ref 68–131)
GLUCOSE SERPL-MCNC: 80 MG/DL (ref 70–110)
HBA1C MFR BLD: 5 % (ref 4–5.6)
HCT VFR BLD AUTO: 45.2 % (ref 40–54)
HDLC SERPL-MCNC: 46 MG/DL (ref 40–75)
HDLC SERPL: 21.7 % (ref 20–50)
HGB BLD-MCNC: 15.1 G/DL (ref 14–18)
IMM GRANULOCYTES # BLD AUTO: 0.01 K/UL (ref 0–0.04)
IMM GRANULOCYTES NFR BLD AUTO: 0.2 % (ref 0–0.5)
LDLC SERPL CALC-MCNC: 148.6 MG/DL (ref 63–159)
LYMPHOCYTES # BLD AUTO: 1.6 K/UL (ref 1–4.8)
LYMPHOCYTES NFR BLD: 26 % (ref 18–48)
MCH RBC QN AUTO: 30.4 PG (ref 27–31)
MCHC RBC AUTO-ENTMCNC: 33.4 G/DL (ref 32–36)
MCV RBC AUTO: 91 FL (ref 82–98)
MONOCYTES # BLD AUTO: 0.4 K/UL (ref 0.3–1)
MONOCYTES NFR BLD: 6.6 % (ref 4–15)
NEUTROPHILS # BLD AUTO: 4 K/UL (ref 1.8–7.7)
NEUTROPHILS NFR BLD: 64.2 % (ref 38–73)
NONHDLC SERPL-MCNC: 166 MG/DL
NRBC BLD-RTO: 0 /100 WBC
PLATELET # BLD AUTO: 284 K/UL (ref 150–450)
PMV BLD AUTO: 9.9 FL (ref 9.2–12.9)
POTASSIUM SERPL-SCNC: 4.4 MMOL/L (ref 3.5–5.1)
PROT SERPL-MCNC: 7.6 G/DL (ref 6–8.4)
RBC # BLD AUTO: 4.97 M/UL (ref 4.6–6.2)
SODIUM SERPL-SCNC: 140 MMOL/L (ref 136–145)
TRIGL SERPL-MCNC: 87 MG/DL (ref 30–150)
TSH SERPL DL<=0.005 MIU/L-ACNC: 1.41 UIU/ML (ref 0.4–4)
WBC # BLD AUTO: 6.19 K/UL (ref 3.9–12.7)

## 2023-01-24 PROCEDURE — 83036 HEMOGLOBIN GLYCOSYLATED A1C: CPT | Performed by: INTERNAL MEDICINE

## 2023-01-24 PROCEDURE — 84443 ASSAY THYROID STIM HORMONE: CPT | Performed by: INTERNAL MEDICINE

## 2023-01-24 PROCEDURE — 80053 COMPREHEN METABOLIC PANEL: CPT | Performed by: INTERNAL MEDICINE

## 2023-01-24 PROCEDURE — 80061 LIPID PANEL: CPT | Performed by: INTERNAL MEDICINE

## 2023-01-24 PROCEDURE — 36415 COLL VENOUS BLD VENIPUNCTURE: CPT | Performed by: INTERNAL MEDICINE

## 2023-01-24 PROCEDURE — 85025 COMPLETE CBC W/AUTO DIFF WBC: CPT | Performed by: INTERNAL MEDICINE

## 2023-01-26 ENCOUNTER — TELEPHONE (OUTPATIENT)
Dept: INTERNAL MEDICINE | Facility: CLINIC | Age: 28
End: 2023-01-26

## 2023-01-26 ENCOUNTER — PATIENT MESSAGE (OUTPATIENT)
Dept: INTERNAL MEDICINE | Facility: CLINIC | Age: 28
End: 2023-01-26

## 2023-01-26 NOTE — TELEPHONE ENCOUNTER
----- Message from Carly Larry MA sent at 1/26/2023  3:20 PM CST -----    ----- Message -----  From: Joshua Fishman MD  Sent: 1/26/2023   3:11 PM CST  To: Kye Lewis Staff    Cholesterol is high, needs to work on diet / exercise. No indication to start cholesterol lowering medication yet.

## 2023-03-31 ENCOUNTER — TELEPHONE (OUTPATIENT)
Dept: NEUROLOGY | Facility: CLINIC | Age: 28
End: 2023-03-31

## 2023-03-31 NOTE — TELEPHONE ENCOUNTER
Patient is scheduled on 4/18/23 when provider is not available to see patients (provider is seeing concussion clinic patients).    Offered to reschedule to 5/02/23 at 8:40am.     Asked if patient has insurance and confirmed that it is [employer]. Advised the patient to add insurance into patient portal

## 2023-05-15 ENCOUNTER — OFFICE VISIT (OUTPATIENT)
Dept: NEUROLOGY | Facility: CLINIC | Age: 28
End: 2023-05-15
Payer: COMMERCIAL

## 2023-05-15 VITALS
BODY MASS INDEX: 28.17 KG/M2 | HEIGHT: 71 IN | WEIGHT: 201.19 LBS | HEART RATE: 94 BPM | SYSTOLIC BLOOD PRESSURE: 123 MMHG | DIASTOLIC BLOOD PRESSURE: 77 MMHG

## 2023-05-15 DIAGNOSIS — G43.709 CHRONIC MIGRAINE WITHOUT AURA WITHOUT STATUS MIGRAINOSUS, NOT INTRACTABLE: Primary | ICD-10-CM

## 2023-05-15 PROCEDURE — 99999 PR PBB SHADOW E&M-EST. PATIENT-LVL III: CPT | Mod: PBBFAC,,, | Performed by: STUDENT IN AN ORGANIZED HEALTH CARE EDUCATION/TRAINING PROGRAM

## 2023-05-15 PROCEDURE — 3078F PR MOST RECENT DIASTOLIC BLOOD PRESSURE < 80 MM HG: ICD-10-PCS | Mod: CPTII,S$GLB,, | Performed by: STUDENT IN AN ORGANIZED HEALTH CARE EDUCATION/TRAINING PROGRAM

## 2023-05-15 PROCEDURE — 1159F MED LIST DOCD IN RCRD: CPT | Mod: CPTII,S$GLB,, | Performed by: STUDENT IN AN ORGANIZED HEALTH CARE EDUCATION/TRAINING PROGRAM

## 2023-05-15 PROCEDURE — 3044F PR MOST RECENT HEMOGLOBIN A1C LEVEL <7.0%: ICD-10-PCS | Mod: CPTII,S$GLB,, | Performed by: STUDENT IN AN ORGANIZED HEALTH CARE EDUCATION/TRAINING PROGRAM

## 2023-05-15 PROCEDURE — 3008F BODY MASS INDEX DOCD: CPT | Mod: CPTII,S$GLB,, | Performed by: STUDENT IN AN ORGANIZED HEALTH CARE EDUCATION/TRAINING PROGRAM

## 2023-05-15 PROCEDURE — 99214 PR OFFICE/OUTPT VISIT, EST, LEVL IV, 30-39 MIN: ICD-10-PCS | Mod: S$GLB,,, | Performed by: STUDENT IN AN ORGANIZED HEALTH CARE EDUCATION/TRAINING PROGRAM

## 2023-05-15 PROCEDURE — 3044F HG A1C LEVEL LT 7.0%: CPT | Mod: CPTII,S$GLB,, | Performed by: STUDENT IN AN ORGANIZED HEALTH CARE EDUCATION/TRAINING PROGRAM

## 2023-05-15 PROCEDURE — 1159F PR MEDICATION LIST DOCUMENTED IN MEDICAL RECORD: ICD-10-PCS | Mod: CPTII,S$GLB,, | Performed by: STUDENT IN AN ORGANIZED HEALTH CARE EDUCATION/TRAINING PROGRAM

## 2023-05-15 PROCEDURE — 3008F PR BODY MASS INDEX (BMI) DOCUMENTED: ICD-10-PCS | Mod: CPTII,S$GLB,, | Performed by: STUDENT IN AN ORGANIZED HEALTH CARE EDUCATION/TRAINING PROGRAM

## 2023-05-15 PROCEDURE — 99214 OFFICE O/P EST MOD 30 MIN: CPT | Mod: S$GLB,,, | Performed by: STUDENT IN AN ORGANIZED HEALTH CARE EDUCATION/TRAINING PROGRAM

## 2023-05-15 PROCEDURE — 99999 PR PBB SHADOW E&M-EST. PATIENT-LVL III: ICD-10-PCS | Mod: PBBFAC,,, | Performed by: STUDENT IN AN ORGANIZED HEALTH CARE EDUCATION/TRAINING PROGRAM

## 2023-05-15 PROCEDURE — 3074F PR MOST RECENT SYSTOLIC BLOOD PRESSURE < 130 MM HG: ICD-10-PCS | Mod: CPTII,S$GLB,, | Performed by: STUDENT IN AN ORGANIZED HEALTH CARE EDUCATION/TRAINING PROGRAM

## 2023-05-15 PROCEDURE — 3078F DIAST BP <80 MM HG: CPT | Mod: CPTII,S$GLB,, | Performed by: STUDENT IN AN ORGANIZED HEALTH CARE EDUCATION/TRAINING PROGRAM

## 2023-05-15 PROCEDURE — 3074F SYST BP LT 130 MM HG: CPT | Mod: CPTII,S$GLB,, | Performed by: STUDENT IN AN ORGANIZED HEALTH CARE EDUCATION/TRAINING PROGRAM

## 2023-05-15 RX ORDER — TOPIRAMATE 25 MG/1
75 TABLET ORAL DAILY
Qty: 90 EACH | Refills: 11 | Status: SHIPPED | OUTPATIENT
Start: 2023-05-15 | End: 2023-11-27 | Stop reason: SDUPTHER

## 2023-05-15 NOTE — PROGRESS NOTES
[facility] - NEUROLOGY [address]TH [address] [facility], [place]    Date: 5/15/23  Patient Name: Zechariah Potter   MRN: [id number]   PCP: Joshua Fishman  Referring Provider: Joshua Fishman MD    Assessment:   Zechariah Potter is a 27 y.o. male presenting to clinic to establish care for migraines. Currently maintained on topiramate 50 QHS but having appx 10 migraines per month. Abortive therapy consists of alternating ubrelvy and nurtec. Patient states they both work equivalently for his migraines. See HPI for full detail. Patient may benefit from icnreased dose of topiramate given 10 events per month. Will increase to 75 daily. Relayed preference for using one of his two abortive medications, no more than 3 times per week as needed. Follow up in clinic in 3 months or sooner if needed.    Plan:     Increased topiramate to 75 daily  Continue nurtec or ubrelvy for abortive therapy, can refill as needed in future  Follow up in clinic in 3 months, or sooner if needed. Message sent for scheduling with another provider.  Patient to message or call with any questions or concerns, specifically with any worsening or changes in migraines. Warning symptoms reviewed  Reviewed past MRI imaging  Patient voiced understanding of and agreement with this plan as outlinesd    Problem List Items Addressed This Visit          Neuro    Chronic migraine without aura without status migrainosus, not intractable - Primary       Andres Grimaldo MD    Patient note was created using MModal Dictation.  Any errors in syntax or even information may not have been identified and edited on initial review prior to signing this note.  Subjective:   Patient seen in consultation at the request of Joshua Fishman MD for the evaluation of migraine. A copy of this note will be sent to the referring physician.        HPI:   Mr. Zechariah Potter is a 27 y.o. male who presented to clinic for migraine disorder. He was previously established with  neurology but was lost to follow up. Patient states that he typically has appx 10 headaches per month. Patient states that his headaches are typically behind both eyes at the same time. He states that it can, at times, radiate bifrontally to the top of his head. He endorses, an initial 6/10 throbbing pain which can be ,at worst, a 8/10 pain if he does not take his abortive medications. He takes either ubrelvy or nurtec for abortive therapy and topiramate 50 QHS for preventative therapy. He states that his abortive medications have typically worked well. He endorses occassional associated blurred vision and dizziness. He will oftentimes feel an aura of eye soreness. He denies any other associated aura. He denies any associated diplopia. Patient states that his headaches can last up to 24 hours if he does not take his abortive medications. Patient states that he will sometimes wake up with his headaches. He denied any positional aspect of his headaches. Patient denies any changes in headache frequency lately but did say that he had longer and more frequent headaches prior to starting ubrelvy, nurtec, and topiramate. He was diagnosed at age 12 years of age. 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     PAST MEDICAL HISTORY:  Past Medical History:   Diagnosis Date    Anxiety     Asthma     Headache(784.0)     OCD (obsessive compulsive disorder)     PDD (pervasive developmental disorder)        PAST SURGICAL HISTORY:  No past surgical history on file.    CURRENT MEDS:  Current Outpatient Medications   Medication Sig Dispense Refill    benzonatate (TESSALON PERLES) 100 MG capsule 1 or 2 every 8 hours prn cough 30 capsule 1    benzonatate (TESSALON PERLES) 100 MG capsule Take 2 capsules (200 mg total) by mouth 3 (three) times daily as needed for Cough. 20 capsule 0    clindamycin (CLEOCIN T) 1 % external solution SMARTSI Topical Twice Daily       "dextroamphetamine-amphetamine (ADDERALL XR) 10 MG 24 hr capsule Take 10 mg by mouth every morning.      lisdexamfetamine (VYVANSE) 70 MG capsule Take 70 mg by mouth. 1 Capsule Oral Every morning      predniSONE (DELTASONE) 20 MG tablet Take 20 mg by mouth 2 (two) times daily.      sertraline (ZOLOFT) 100 MG tablet Take 250 mg by mouth once daily. Take two pills daily      ubrogepant (UBRELVY) 50 mg tablet Take 1 50 mg tablet by mouth at onset of headache; if needed a second dose may be taken at least 2 hours after the initial dose; MAX dose 200 mg/24 hr 10 tablet 2    topiramate (TOPAMAX) 25 MG tablet Take 3 tablets (75 mg total) by mouth once daily. 90 each 11     No current facility-administered medications for this visit.       ALLERGIES:  Review of patient's allergies indicates:   Allergen Reactions    Cephalosporins      Other reaction(s): Hives    Penicillins      Other reaction(s): Hives       FAMILY HISTORY:  Family History   Problem Relation Age of Onset    Migraines Mother        SOCIAL HISTORY:  Social History     Tobacco Use    Smoking status: Never    Smokeless tobacco: Never   Substance Use Topics    Alcohol use: Yes     Comment: occ    Drug use: No       Review of Systems:  12 system review of systems is negative except for the symptoms mentioned in HPI.      Objective:     Vitals:    05/15/23 1423   BP: 123/77   Pulse: 94   Weight: 91.3 kg (201 lb 2.7 oz)   Height: 5' 11" (1.803 m)     General: NAD, well nourished   Eyes: no tearing, discharge, no erythema   ENT: moist mucous membranes of the oral cavity, nares patent    Neck: Supple, full range of motion  Cardiovascular: Warm and well perfused, pulses equal and symmetrical  Lungs: Normal work of breathing, normal chest wall excursions  Skin: No rash, lesions, or breakdown on exposed skin  Psychiatry: Mood and affect are appropriate   Abdomen: soft, non tender, non distended  Extremeties: No cyanosis, clubbing or edema.  +HA    Neurological   MENTAL " STATUS: Alert and oriented to person, place, and time. Attention and concentration within normal limits. Speech without dysarthria, able to name and repeat without difficulty. Recent and remote memory within normal limits   CRANIAL NERVES: Visual fields intact. PERRL. EOMI. Facial sensation intact. Face symmetrical. Hearing grossly intact. Full shoulder shrug bilaterally. Tongue protrudes midline   SENSORY: Sensation is intact to light touch throughout.  Joint position perception intact. Negative Romberg.   MOTOR: Normal bulk and tone. No pronator drift.  5/5 deltoid, biceps, triceps, interosseous, hand  bilaterally. 5/5 iliopsoas, knee extension/flexion, foot dorsi/plantarflexion bilaterally.    REFLEXES: Symmetric and 2+ throughout. Toes down going bilaterally.   CEREBELLAR/COORDINATION/GAIT: Gait steady with normal arm swing and stride length.  Heel to shin intact. Finger to nose intact. Normal rapid alternating movements.

## 2023-05-29 ENCOUNTER — E-VISIT (OUTPATIENT)
Dept: INTERNAL MEDICINE | Facility: CLINIC | Age: 28
End: 2023-05-29
Payer: COMMERCIAL

## 2023-05-29 ENCOUNTER — PATIENT MESSAGE (OUTPATIENT)
Dept: INTERNAL MEDICINE | Facility: CLINIC | Age: 28
End: 2023-05-29

## 2023-05-29 DIAGNOSIS — U07.1 COVID-19 VIRUS INFECTION: Primary | ICD-10-CM

## 2023-05-29 PROCEDURE — 99421 PR E&M, ONLINE DIGIT, EST, < 7 DAYS, 5-10 MINS: ICD-10-PCS | Mod: 95,,, | Performed by: INTERNAL MEDICINE

## 2023-05-29 PROCEDURE — 99421 OL DIG E/M SVC 5-10 MIN: CPT | Mod: 95,,, | Performed by: INTERNAL MEDICINE

## 2023-05-29 NOTE — PROGRESS NOTES
Patient ID: Zechariah Potter is a 27 y.o. male.    Chief Complaint: No chief complaint on file.    The patient initiated a request through [employer] on 5/29/2023 for evaluation and management with a chief complaint of No chief complaint on file.     I evaluated the questionnaire submission on 5/29/2023.    Ohs Peq Evisit Upper Respitatory/Cough Questionnaire    5/29/2023  1:58 PM CDT - Filed by Patient   Do you agree to participate in an E-Visit? Yes   If you have any of the following symptoms, please present to your local ER or call 911:  I acknowledge   What is the main issue that you would like for your doctor to address today? Positive for COVID with sinus congestion   Are you able to take your vital signs? Yes   Systolic Blood Pressure:    Diastolic Blood Pressure:    Weight: 185   Height: 60   Pulse:    Temperature: 97.7   Respiration rate:    Pulse Oxygen:    What symptoms do you currently have?  Chills;  Muscle or body aches;  Runny nose   Have you ever smoked? I have smoked in the past   Have you had a fever? Yes   What has been the range of your fever? Less than 100.4   When did your symptoms first appear? 5/28/2023   In the last two weeks, have you been in close contact with someone who has COVID-19 or the Flu? Yes , Covid-19   In the last two weeks, have you worked or volunteered in a healthcare facility or as a ? Healthcare facilities include a hospital, medical or dental clinic, long-term care facility, or nursing home No   Do you live in a long-term care facility, nursing home, group home, or homeless shelter? No   List what you have done or taken to help your symptoms. Zyrtec, Ibuprofen, Flonase sensimist   How severe are your symptoms? Mild   Have your symptoms improved since they first appeared? Better   Have you taken an at home Covid test? Yes   What were the results? Positive   Have you taken a Flu test? No   Have you been fully vaccinated for COVID? (2 Pfizer, 2 Moderna or 1 Adryan  & Adryan vaccine injections) Yes   Have you received a booster? Yes   Have you recieved a Flu shot? Yes   When did you recieve your Flu shot? 11/10/2022   Do you have transportation to get tested for COVID if it is indicated and ordered for you at an [facility] location? Yes   Provide any information you feel is important to your history not asked above What am allergic to penecilin and cephalosporins   Please attach any relevant images or files          Recent Labs Obtained:  No visits with results within 7 Day(s) from this visit.   Latest known visit with results is:   Lab Visit on 01/24/2023   Component Date Value Ref Range Status    WBC 01/24/2023 6.19  3.90 - 12.70 K/uL Final    RBC 01/24/2023 4.97  4.60 - 6.20 M/uL Final    Hemoglobin 01/24/2023 15.1  14.0 - 18.0 g/dL Final    Hematocrit 01/24/2023 45.2  40.0 - 54.0 % Final    MCV 01/24/2023 91  82 - 98 fL Final    MCH 01/24/2023 30.4  27.0 - 31.0 pg Final    MCHC 01/24/2023 33.4  32.0 - 36.0 g/dL Final    RDW 01/24/2023 12.5  11.5 - 14.5 % Final    Platelets 01/24/2023 284  150 - 450 K/uL Final    MPV 01/24/2023 9.9  9.2 - 12.9 fL Final    Immature Granulocytes 01/24/2023 0.2  0.0 - 0.5 % Final    Gran # (ANC) 01/24/2023 4.0  1.8 - 7.7 K/uL Final    Immature Grans (Abs) 01/24/2023 0.01  0.00 - 0.04 K/uL Final    Comment: Mild elevation in immature granulocytes is non specific and   can be seen in a variety of conditions including stress response,   acute inflammation, trauma and pregnancy. Correlation with other   laboratory and clinical findings is essential.      Lymph # 01/24/2023 1.6  1.0 - 4.8 K/uL Final    Mono # 01/24/2023 0.4  0.3 - 1.0 K/uL Final    Eos # 01/24/2023 0.2  0.0 - 0.5 K/uL Final    Baso # 01/24/2023 0.04  0.00 - 0.20 K/uL Final    nRBC 01/24/2023 0  0 /100 WBC Final    Gran % 01/24/2023 64.2  38.0 - 73.0 % Final    Lymph % 01/24/2023 26.0  18.0 - 48.0 % Final    Mono % 01/24/2023 6.6  4.0 - 15.0 % Final    Eosinophil % 01/24/2023 2.4  0.0  - 8.0 % Final    Basophil % 01/24/2023 0.6  0.0 - 1.9 % Final    Differential Method 01/24/2023 Automated   Final    Sodium 01/24/2023 140  136 - 145 mmol/L Final    Potassium 01/24/2023 4.4  3.5 - 5.1 mmol/L Final    Chloride 01/24/2023 105  95 - 110 mmol/L Final    CO2 01/24/2023 25  23 - 29 mmol/L Final    Glucose 01/24/2023 80  70 - 110 mg/dL Final    BUN 01/24/2023 15  6 - 20 mg/dL Final    Creatinine 01/24/2023 0.9  0.5 - 1.4 mg/dL Final    Calcium 01/24/2023 9.9  8.7 - 10.5 mg/dL Final    Total Protein 01/24/2023 7.6  6.0 - 8.4 g/dL Final    Albumin 01/24/2023 4.4  3.5 - 5.2 g/dL Final    Total Bilirubin 01/24/2023 0.5  0.1 - 1.0 mg/dL Final    Comment: For infants and newborns, interpretation of results should be based  on gestational age, weight and in agreement with clinical  observations.    Premature Infant recommended reference ranges:  Up to 24 hours.............<8.0 mg/dL  Up to 48 hours............<12.0 mg/dL  3-5 days..................<15.0 mg/dL  6-29 days.................<15.0 mg/dL      Alkaline Phosphatase 01/24/2023 50 (L)  55 - 135 U/L Final    AST 01/24/2023 22  10 - 40 U/L Final    ALT 01/24/2023 24  10 - 44 U/L Final    Anion Gap 01/24/2023 10  8 - 16 mmol/L Final    eGFR 01/24/2023 >60.0  >60 mL/min/1.73 m^2 Final    TSH 01/24/2023 1.412  0.400 - 4.000 uIU/mL Final    Hemoglobin A1C 01/24/2023 5.0  4.0 - 5.6 % Final    Comment: ADA Screening Guidelines:  5.7-6.4%  Consistent with prediabetes  >or=6.5%  Consistent with diabetes    High levels of fetal hemoglobin interfere with the HbA1C  assay. Heterozygous hemoglobin variants (HbS, HgC, etc)do  not significantly interfere with this assay.   However, presence of multiple variants may affect accuracy.      Estimated Avg Glucose 01/24/2023 97  68 - 131 mg/dL Final    Cholesterol 01/24/2023 212 (H)  120 - 199 mg/dL Final    Comment: The National Cholesterol Education Program (NCEP) has set the  following guidelines (reference ranges) for  Cholesterol:  Optimal.....................<200 mg/dL  Borderline High.............200-239 mg/dL  High........................> or = 240 mg/dL      Triglycerides 01/24/2023 87  30 - 150 mg/dL Final    Comment: The National Cholesterol Education Program (NCEP) has set the  following guidelines (reference values) for triglycerides:  Normal......................<150 mg/dL  Borderline High.............150-199 mg/dL  High........................200-499 mg/dL      HDL 01/24/2023 46  40 - 75 mg/dL Final    Comment: The National Cholesterol Education Program (NCEP) has set the  following guidelines (reference values) for HDL Cholesterol:  Low...............<40 mg/dL  Optimal...........>60 mg/dL      LDL Cholesterol 01/24/2023 148.6  63.0 - 159.0 mg/dL Final    Comment: The National Cholesterol Education Program (NCEP) has set the  following guidelines (reference values) for LDL Cholesterol:  Optimal.......................<130 mg/dL  Borderline High...............130-159 mg/dL  High..........................160-189 mg/dL  Very High.....................>190 mg/dL      HDL/Cholesterol Ratio 01/24/2023 21.7  20.0 - 50.0 % Final    Total Cholesterol/HDL Ratio 01/24/2023 4.6  2.0 - 5.0 Final    Non-HDL Cholesterol 01/24/2023 166  mg/dL Final    Comment: Risk category and Non-HDL cholesterol goals:  Coronary heart disease (CHD)or equivalent (10-year risk of CHD >20%):  Non-HDL cholesterol goal     <130 mg/dL  Two or more CHD risk factors and 10-year risk of CHD <= 20%:  Non-HDL cholesterol goal     <160 mg/dL  0 to 1 CHD risk factor:  Non-HDL cholesterol goal     <190 mg/dL         Encounter Diagnosis   Name Primary?    COVID-19 virus infection Yes      Symptomatic 5/28/2023  Tested (+) 5/29/2023    No orders of the defined types were placed in this encounter.     Called and spoke to patient  Mild symptoms, covid-19 risk score 1  On ADHD medications - drug/drug interaction  Does not feel that he needs treatment for covid-19 at this  time  Instructed on symptomatic relief - to take mucinex to loosen up mucous, advised tylenol/ibuprofen for fever/body aches.   Advised on quarantine for 5 days, to return to activities if improved after 5 days.   ER precautions discussed.          Follow up if symptoms worsen or fail to improve.      E-Visit Time Trackin minutes

## 2023-08-11 ENCOUNTER — PATIENT MESSAGE (OUTPATIENT)
Dept: RESEARCH | Facility: HOSPITAL | Age: 28
End: 2023-08-11
Payer: COMMERCIAL

## 2023-08-21 DIAGNOSIS — G43.709 CHRONIC MIGRAINE WITHOUT AURA WITHOUT STATUS MIGRAINOSUS, NOT INTRACTABLE: ICD-10-CM

## 2023-08-21 RX ORDER — UBROGEPANT 50 MG/1
TABLET ORAL
Qty: 10 TABLET | Refills: 2 | Status: SHIPPED | OUTPATIENT
Start: 2023-08-21

## 2023-08-21 NOTE — TELEPHONE ENCOUNTER
Refill Routing Note   Medication(s) are not appropriate for processing by [facility] for the following reason(s):      Medication outside of protocol    [facility] action(s):  Route Care Due:  None identified            Appointments  past 12m or future 3m with PCP    Date Provider   Last Visit   5/29/2023 Joshua Fishman MD   Next Visit   Visit date not found Joshua Fishman MD   ED visits in past 90 days: 0        Note composed:1:36 PM 08/21/2023

## 2023-09-15 ENCOUNTER — TELEPHONE (OUTPATIENT)
Dept: INTERNAL MEDICINE | Facility: CLINIC | Age: 28
End: 2023-09-15
Payer: COMMERCIAL

## 2023-09-15 NOTE — TELEPHONE ENCOUNTER
PA for Ubrelvy 50MG tablets started and approved      Zechariah Potter Key: [id number] - PA Case ID: [id number] - Rx #: [id number]  Approvedtoday  CaseId:[id number];Status:Approved;Review Type:Prior Auth;Coverage Start Date:09/15/2023;Coverage End Date:09/14/2024;  Drug  Ubrelvy 50MG tablets  Form [employer] Electronic PA Form (2017 NCPDP)  Original Claim Info  75

## 2023-11-27 DIAGNOSIS — G43.709 CHRONIC MIGRAINE WITHOUT AURA WITHOUT STATUS MIGRAINOSUS, NOT INTRACTABLE: Primary | ICD-10-CM

## 2023-11-27 RX ORDER — UBROGEPANT 50 MG/1
TABLET ORAL
Qty: 10 TABLET | Refills: 2 | Status: CANCELLED | OUTPATIENT
Start: 2023-11-27

## 2023-11-30 RX ORDER — UBROGEPANT 100 MG/1
100 TABLET ORAL
Qty: 10 TABLET | Refills: 12 | Status: SHIPPED | OUTPATIENT
Start: 2023-11-30

## 2023-12-09 ENCOUNTER — OFFICE VISIT (OUTPATIENT)
Dept: URGENT CARE | Facility: CLINIC | Age: 28
End: 2023-12-09
Payer: COMMERCIAL

## 2023-12-09 VITALS
SYSTOLIC BLOOD PRESSURE: 110 MMHG | HEART RATE: 118 BPM | RESPIRATION RATE: 17 BRPM | TEMPERATURE: 99 F | OXYGEN SATURATION: 96 % | WEIGHT: 201 LBS | DIASTOLIC BLOOD PRESSURE: 61 MMHG | BODY MASS INDEX: 28.14 KG/M2 | HEIGHT: 71 IN

## 2023-12-09 DIAGNOSIS — J30.9 ALLERGIC RHINITIS, UNSPECIFIED SEASONALITY, UNSPECIFIED TRIGGER: ICD-10-CM

## 2023-12-09 DIAGNOSIS — J01.90 ACUTE NON-RECURRENT SINUSITIS, UNSPECIFIED LOCATION: ICD-10-CM

## 2023-12-09 DIAGNOSIS — J06.9 ACUTE URI: Primary | ICD-10-CM

## 2023-12-09 DIAGNOSIS — J20.8 ACUTE VIRAL BRONCHITIS: ICD-10-CM

## 2023-12-09 PROCEDURE — 99214 PR OFFICE/OUTPT VISIT, EST, LEVL IV, 30-39 MIN: ICD-10-PCS | Mod: S$GLB,,, | Performed by: FAMILY MEDICINE

## 2023-12-09 PROCEDURE — 99214 OFFICE O/P EST MOD 30 MIN: CPT | Mod: S$GLB,,, | Performed by: FAMILY MEDICINE

## 2023-12-09 RX ORDER — LORATADINE 10 MG/1
10 TABLET ORAL DAILY
Qty: 30 TABLET | Refills: 0 | Status: SHIPPED | OUTPATIENT
Start: 2023-12-09 | End: 2024-01-08

## 2023-12-09 NOTE — PROGRESS NOTES
"Subjective:      Patient ID: Zechariah Potter is a 28 y.o. male.    Vitals:  height is 5' 11" (1.803 m) and weight is 91.2 kg (201 lb). His oral temperature is 98.9 °F (37.2 °C). His blood pressure is 110/61 and his pulse is 118 (abnormal). His respiration is 17 and oxygen saturation is 96%.     Chief Complaint: Cough    Pt states he only has a cough that has been tyson on for 2 weeks, it is unchanged but he sometimes is spitting up yellow phlegm, no other associated sx. Cough medicine was used to treat.    Cough  This is a new problem. Episode onset: 2 weeks ago. The problem has been unchanged. The problem occurs every few minutes. The cough is Productive of sputum.       Respiratory:  Positive for cough.       Objective:     Physical Exam   Constitutional: He is oriented to person, place, and time. He appears well-developed. He is cooperative.  Non-toxic appearance. He does not appear ill. No distress.   HENT:   Head: Normocephalic and atraumatic.   Ears:   Right Ear: Hearing, tympanic membrane, external ear and ear canal normal. impacted cerumen  Left Ear: Hearing, tympanic membrane, external ear and ear canal normal.   Nose: Congestion present. No mucosal edema, rhinorrhea or nasal deformity. No epistaxis. Right sinus exhibits no maxillary sinus tenderness and no frontal sinus tenderness. Left sinus exhibits no maxillary sinus tenderness and no frontal sinus tenderness.   Mouth/Throat: Uvula is midline, oropharynx is clear and moist and mucous membranes are normal. No trismus in the jaw. Normal dentition. No uvula swelling. No oropharyngeal exudate, posterior oropharyngeal edema or posterior oropharyngeal erythema.   Eyes: Conjunctivae and lids are normal. No scleral icterus.   Neck: Trachea normal and phonation normal. Neck supple. No edema present. No erythema present. No neck rigidity present.   Cardiovascular: Normal rate, regular rhythm, normal heart sounds and normal pulses.   No murmur " heard.  Pulmonary/Chest: Effort normal and breath sounds normal. No stridor. No respiratory distress. He has no decreased breath sounds. He has no wheezes. He has no rhonchi. He has no rales.   Abdominal: Normal appearance. He exhibits no distension. There is no abdominal tenderness. There is no left CVA tenderness and no right CVA tenderness.   Musculoskeletal: Normal range of motion.         General: No deformity. Normal range of motion.      Cervical back: He exhibits no tenderness.   Lymphadenopathy:     He has no cervical adenopathy.   Neurological: He is alert, oriented to person, place, and time and at baseline. He exhibits normal muscle tone. Coordination normal.   Skin: Skin is warm, dry, intact, not diaphoretic and not pale.   Psychiatric: His speech is normal and behavior is normal. Judgment and thought content normal.   Nursing note and vitals reviewed.      Assessment:     1. Acute URI    2. Acute viral bronchitis    3. Acute non-recurrent sinusitis, unspecified location    4. Allergic rhinitis, unspecified seasonality, unspecified trigger        Plan:   Discussed exam findings/results/diagnosis/plan with patient in clinic. Advised to f/u with PCP within 2-5 days. ER precautions given if symptoms get any worse. All questions answered. Patient verbally understood and agreed with treatment plan.     Acute URI    Acute viral bronchitis    Acute non-recurrent sinusitis, unspecified location    Allergic rhinitis, unspecified seasonality, unspecified trigger    Other orders  -     loratadine (CLARITIN) 10 mg tablet; Take 1 tablet (10 mg total) by mouth once daily.  Dispense: 30 tablet; Refill: 0

## 2023-12-13 ENCOUNTER — OFFICE VISIT (OUTPATIENT)
Dept: URGENT CARE | Facility: CLINIC | Age: 28
End: 2023-12-13
Payer: COMMERCIAL

## 2023-12-13 VITALS
HEART RATE: 106 BPM | WEIGHT: 201 LBS | RESPIRATION RATE: 16 BRPM | SYSTOLIC BLOOD PRESSURE: 133 MMHG | BODY MASS INDEX: 28.14 KG/M2 | TEMPERATURE: 99 F | DIASTOLIC BLOOD PRESSURE: 88 MMHG | HEIGHT: 71 IN | OXYGEN SATURATION: 95 %

## 2023-12-13 DIAGNOSIS — R09.82 POSTNASAL DRIP: ICD-10-CM

## 2023-12-13 DIAGNOSIS — J01.90 ACUTE NON-RECURRENT SINUSITIS, UNSPECIFIED LOCATION: Primary | ICD-10-CM

## 2023-12-13 DIAGNOSIS — R05.9 COUGH, UNSPECIFIED TYPE: ICD-10-CM

## 2023-12-13 DIAGNOSIS — J20.9 ACUTE BRONCHITIS DUE TO INFECTION: ICD-10-CM

## 2023-12-13 PROCEDURE — 99214 OFFICE O/P EST MOD 30 MIN: CPT | Mod: S$GLB,,, | Performed by: FAMILY MEDICINE

## 2023-12-13 PROCEDURE — 99214 PR OFFICE/OUTPT VISIT, EST, LEVL IV, 30-39 MIN: ICD-10-PCS | Mod: S$GLB,,, | Performed by: FAMILY MEDICINE

## 2023-12-13 RX ORDER — BENZONATATE 200 MG/1
200 CAPSULE ORAL 3 TIMES DAILY PRN
Qty: 21 CAPSULE | Refills: 0 | Status: SHIPPED | OUTPATIENT
Start: 2023-12-13 | End: 2023-12-20

## 2023-12-13 RX ORDER — AZITHROMYCIN 250 MG/1
TABLET, FILM COATED ORAL
Qty: 6 TABLET | Refills: 0 | Status: SHIPPED | OUTPATIENT
Start: 2023-12-13

## 2023-12-13 NOTE — PROGRESS NOTES
"Subjective:      Patient ID: Zechariah Potter is a 28 y.o. male.    Vitals:  height is 5' 11" (1.803 m) and weight is 91.2 kg (201 lb). His oral temperature is 99.2 °F (37.3 °C). His blood pressure is 133/88 and his pulse is 106. His respiration is 16 and oxygen saturation is 95%.     Chief Complaint: Cough (I need an antibiotic or a careful examination of my bad cough that I've been having for 3 weeks now. - Entered by patient)    Patient was seen here 3 weeks ago with URI.  Reports little to no improvement with OTC meds and Claritin.  Reports increasing cough and sinus congestion/pressure.  Denies fever, chills, chest pain, SOB, diarrhea, vomiting, weakness.    Cough  This is a new problem. Episode onset: 3 weeks. The problem has been gradually worsening. The problem occurs every few minutes. The cough is Productive of sputum.       Respiratory:  Positive for cough.       Objective:     Physical Exam   Constitutional: He is oriented to person, place, and time. He appears well-developed. He is cooperative.  Non-toxic appearance. He does not appear ill. No distress.   HENT:   Head: Normocephalic and atraumatic.   Ears:   Right Ear: Hearing, tympanic membrane, external ear and ear canal normal. impacted cerumen  Left Ear: Hearing, tympanic membrane, external ear and ear canal normal. impacted cerumen  Nose: Congestion present. No mucosal edema, rhinorrhea or nasal deformity. No epistaxis. Right sinus exhibits no maxillary sinus tenderness and no frontal sinus tenderness. Left sinus exhibits no maxillary sinus tenderness and no frontal sinus tenderness.   Mouth/Throat: Uvula is midline, oropharynx is clear and moist and mucous membranes are normal. No trismus in the jaw. Normal dentition. No uvula swelling. No oropharyngeal exudate, posterior oropharyngeal edema or posterior oropharyngeal erythema.   Eyes: Conjunctivae and lids are normal. No scleral icterus.   Neck: Trachea normal and phonation normal. Neck supple. No " edema present. No erythema present. No neck rigidity present.   Cardiovascular: Normal rate, regular rhythm, normal heart sounds and normal pulses.   No murmur heard.  Pulmonary/Chest: Effort normal and breath sounds normal. No stridor. No respiratory distress. He has no decreased breath sounds. He has no wheezes. He has no rhonchi. He has no rales.   Abdominal: Normal appearance. He exhibits no distension. There is no abdominal tenderness. There is no left CVA tenderness and no right CVA tenderness.   Musculoskeletal: Normal range of motion.         General: No deformity. Normal range of motion.      Cervical back: He exhibits no tenderness.   Lymphadenopathy:     He has no cervical adenopathy.   Neurological: He is alert, oriented to person, place, and time and at baseline. He exhibits normal muscle tone. Coordination normal.   Skin: Skin is warm, dry, intact, not diaphoretic and not pale.   Psychiatric: His speech is normal and behavior is normal. Judgment and thought content normal.   Nursing note and vitals reviewed.      Assessment:     1. Acute non-recurrent sinusitis, unspecified location    2. Acute bronchitis due to infection    3. Cough, unspecified type    4. Postnasal drip        Plan:   Discussed exam findings/diagnosis/plan with patient in clinic.  Sinus precautions advised.  Advised to f/u with PCP within 2-5 days. ER precautions given if symptoms get any worse. All questions answered. Patient verbally understood and agreed with treatment plan.       Acute non-recurrent sinusitis, unspecified location    Acute bronchitis due to infection    Cough, unspecified type    Postnasal drip    Other orders  -     azithromycin (ZITHROMAX Z-LORETTA) 250 MG tablet; Take 2 tablets (500 mg) on  Day 1,  followed by 1 tablet (250 mg) once daily on Days 2 through 5.  Dispense: 6 tablet; Refill: 0  -     benzonatate (TESSALON) 200 MG capsule; Take 1 capsule (200 mg total) by mouth 3 (three) times daily as needed for  Cough.  Dispense: 21 capsule; Refill: 0

## 2024-01-18 ENCOUNTER — TELEPHONE (OUTPATIENT)
Dept: NEUROLOGY | Facility: CLINIC | Age: 29
End: 2024-01-18
Payer: COMMERCIAL

## 2024-01-18 NOTE — TELEPHONE ENCOUNTER
Spoke to pt to explain previous provider is no longer here and he needs to switch to another provider to get his rx refilled. Offered him soonest appt with another provider--Feb 19 @ 1pm w/ Dr. Pace. Pt verbalized understanding and confirmed appt details. Dr. Pace went ahead and refilled rx once only prior to his visit.

## 2024-02-19 ENCOUNTER — OFFICE VISIT (OUTPATIENT)
Dept: NEUROLOGY | Facility: CLINIC | Age: 29
End: 2024-02-19
Payer: COMMERCIAL

## 2024-02-19 VITALS — SYSTOLIC BLOOD PRESSURE: 118 MMHG | HEART RATE: 98 BPM | DIASTOLIC BLOOD PRESSURE: 82 MMHG

## 2024-02-19 DIAGNOSIS — G43.709 CHRONIC MIGRAINE WITHOUT AURA WITHOUT STATUS MIGRAINOSUS, NOT INTRACTABLE: Primary | ICD-10-CM

## 2024-02-19 PROCEDURE — 99999 PR PBB SHADOW E&M-EST. PATIENT-LVL III: CPT | Mod: PBBFAC,,,

## 2024-02-19 PROCEDURE — 3044F HG A1C LEVEL LT 7.0%: CPT | Mod: CPTII,S$GLB,, | Performed by: PSYCHIATRY & NEUROLOGY

## 2024-02-19 PROCEDURE — 3074F SYST BP LT 130 MM HG: CPT | Mod: CPTII,S$GLB,, | Performed by: PSYCHIATRY & NEUROLOGY

## 2024-02-19 PROCEDURE — 99214 OFFICE O/P EST MOD 30 MIN: CPT | Mod: S$GLB,,, | Performed by: PSYCHIATRY & NEUROLOGY

## 2024-02-19 PROCEDURE — 3079F DIAST BP 80-89 MM HG: CPT | Mod: CPTII,S$GLB,, | Performed by: PSYCHIATRY & NEUROLOGY

## 2024-02-19 RX ORDER — SUMATRIPTAN SUCCINATE 100 MG/1
100 TABLET ORAL
COMMUNITY

## 2024-02-19 RX ORDER — TOPIRAMATE 100 MG/1
100 TABLET, FILM COATED ORAL DAILY
Qty: 90 TABLET | Refills: 3 | Status: SHIPPED | OUTPATIENT
Start: 2024-02-19 | End: 2024-04-23 | Stop reason: SDUPTHER

## 2024-02-19 NOTE — PROGRESS NOTES
[facility] - NEUROLOGY [address] [facility], [place]    Date: 2/19/24  Patient Name: Zechariah Potter   MRN: [id number]   PCP: Joshua Fishman  Referring Provider: No ref. provider found    Assessment:   Zechariah Potter is a 28 y.o. male presenting for establishing care for migraines. Previous patient of Dr. Talley then Dr. Grimaldo w/ migraines since childhood. Overall has been doing well on topamax but still having about 8 HA days per month. Generally is able to get relief w/ ubrelvy as an abortive, if not can use ibuprofen. No major concerns at this visit. Discussed increasing topamax (max dose of 100 for HA).    Plan:     Increase topamax to 100mg daily  Continue w/ ubrelvy/ibuprofen as abortives  RTC in 1 year    Problem List Items Addressed This Visit          Neuro    Chronic migraine without aura without status migrainosus, not intractable - Primary    Relevant Medications    topiramate (TOPAMAX) 100 MG tablet       Eric Pace MD      Subjective:        HPI:   Mr. Zechariah Potter is a 28 y.o. male presenting for evaluation of headaches.    Previously saw Dr. Grimaldo in 5/2023. At that time he was having 10 HA days per month, and the plan was to increase topamax to 75mg daily, continuing ubrelvy for an abortive  He now has about 8 HA days per month.  Abortive: ubrelvy, usually works but if not will take ibuprofen which generally works    Migraine Hx:  PMH of chiari 1 malformation, migraine, anxiety, OCD, ADHD, autism   Fam hx of migraines  Has had migraines since 12 years old  Described as retro-orbital in nature - 6 to 8 out of 10  Seem to be brought on by anxiety and alcohol use  Aura of eye soreness  Prior daily preventatives: amitriptyline  Prior abortives: imitrex (worked well), nurtec (worked well)    PAST MEDICAL HISTORY:  Past Medical History:   Diagnosis Date    Anxiety     Asthma     Headache(784.0)     OCD (obsessive compulsive disorder)     PDD (pervasive  developmental disorder)        PAST SURGICAL HISTORY:  No past surgical history on file.    CURRENT MEDS:  Current Outpatient Medications   Medication Sig Dispense Refill    clindamycin (CLEOCIN T) 1 % external solution SMARTSI Topical Twice Daily      dextroamphetamine-amphetamine (ADDERALL XR) 10 MG 24 hr capsule Take 10 mg by mouth every morning.      lisdexamfetamine (VYVANSE) 70 MG capsule Take 70 mg by mouth. 1 Capsule Oral Every morning      sertraline (ZOLOFT) 100 MG tablet Take 250 mg by mouth once daily. Take two pills daily      sumatriptan (IMITREX) 100 MG tablet Take 100 mg by mouth every 2 (two) hours as needed for Migraine.      ubrogepant (UBRELVY) 100 mg tablet Take 1 tablet (100 mg total) by mouth as needed for Migraine. If symptoms persist or return, may repeat dose after 2 hours. Maximum: 200 mg per 24 hours 10 tablet 12    azithromycin (ZITHROMAX Z-LORETTA) 250 MG tablet Take 2 tablets (500 mg) on  Day 1,  followed by 1 tablet (250 mg) once daily on Days 2 through 5. (Patient not taking: Reported on 2024) 6 tablet 0    benzonatate (TESSALON PERLES) 100 MG capsule Take 2 capsules (200 mg total) by mouth 3 (three) times daily as needed for Cough. (Patient not taking: Reported on 2024) 20 capsule 0    loratadine (CLARITIN) 10 mg tablet Take 1 tablet (10 mg total) by mouth once daily. 30 tablet 0    predniSONE (DELTASONE) 20 MG tablet Take 20 mg by mouth 2 (two) times daily.      topiramate (TOPAMAX) 100 MG tablet Take 1 tablet (100 mg total) by mouth once daily. 90 tablet 3    ubrogepant (UBRELVY) 50 mg tablet Take 1 50 mg tablet by mouth at onset of headache; if needed a second dose may be taken at least 2 hours after the initial dose; MAX dose 200 mg/24 hr (Patient not taking: Reported on 2024) 10 tablet 2     No current facility-administered medications for this visit.       ALLERGIES:  Review of patient's allergies indicates:   Allergen Reactions    Cephalosporins      Other  reaction(s): Hives    Penicillins      Other reaction(s): Hives       FAMILY HISTORY:  Family History   Problem Relation Age of Onset    Migraines Mother        SOCIAL HISTORY:  Social History     Tobacco Use    Smoking status: Never    Smokeless tobacco: Never   Substance Use Topics    Alcohol use: Yes     Comment: occ    Drug use: No       Review of Systems:  12 system review of systems is negative except for the symptoms mentioned in HPI.      Objective:     Vitals:    02/19/24 1306   BP: 118/82   Pulse: 98     General: NAD, well-appearing  Eyes: no discharge or erythema   ENT: mucous membranes moist  Neck: Supple, full range of motion  Lungs: Normal work of breathing, not in respiratory distress  Skin: No rash or lesions  Psychiatry: Mood and affect are appropriate   Abdomen: flat, non-distended  Extremeties: No cyanosis or edema.    Neurological Exam:  MENTAL STATUS: Alert and oriented to person, place, and time. Attention and concentration within normal limits. Speech without dysarthria, able to name and repeat without difficulty. Recent and remote memory within normal limits.   CRANIAL NERVES: Visual fields intact. PERRL. EOMI. Facial sensation intact. Facial expression symmetrical. Hearing grossly intact. Full shoulder shrug bilaterally.   SENSORY: Sensation is intact to light touch throughout. Negative Romberg.  MOTOR: Normal bulk and tone. 5/5 deltoid, biceps, triceps, interosseous, hand  bilaterally. 5/5 hip flexion/extension, knee extension/flexion, and dorsi-/plantarflexion bilaterally.    CEREBELLAR/COORDINATION/GAIT: Gait steady with normal arm swing and stride length. Finger to nose intact.    Labs/Imaging:  MRI Brain:  IMPRESSION:      1.  Approximately 6-mm of inferior displacement of the cerebellar tonsils through the foramen magnum with fullness/crowding of the posterior fossa suggestive of Chiari 1 malformation.     2.  Otherwise, no MRI evidence of acute intracranial abnormality.

## 2024-04-19 ENCOUNTER — OFFICE VISIT (OUTPATIENT)
Dept: INTERNAL MEDICINE | Facility: CLINIC | Age: 29
End: 2024-04-19
Payer: COMMERCIAL

## 2024-04-19 ENCOUNTER — LAB VISIT (OUTPATIENT)
Dept: LAB | Facility: HOSPITAL | Age: 29
End: 2024-04-19
Attending: INTERNAL MEDICINE
Payer: COMMERCIAL

## 2024-04-19 VITALS
BODY MASS INDEX: 28.8 KG/M2 | OXYGEN SATURATION: 96 % | WEIGHT: 205.69 LBS | HEART RATE: 110 BPM | SYSTOLIC BLOOD PRESSURE: 110 MMHG | HEIGHT: 71 IN | DIASTOLIC BLOOD PRESSURE: 66 MMHG

## 2024-04-19 DIAGNOSIS — Z00.00 LABORATORY EXAMINATION ORDERED AS PART OF A COMPLETE PHYSICAL EXAMINATION: ICD-10-CM

## 2024-04-19 DIAGNOSIS — F90.0 ATTENTION DEFICIT HYPERACTIVITY DISORDER (ADHD), PREDOMINANTLY INATTENTIVE TYPE: ICD-10-CM

## 2024-04-19 DIAGNOSIS — Z00.00 ENCOUNTER FOR ANNUAL PHYSICAL EXAM: Primary | ICD-10-CM

## 2024-04-19 DIAGNOSIS — G43.709 CHRONIC MIGRAINE WITHOUT AURA WITHOUT STATUS MIGRAINOSUS, NOT INTRACTABLE: ICD-10-CM

## 2024-04-19 DIAGNOSIS — F84.0 AUTISM SPECTRUM DISORDER: ICD-10-CM

## 2024-04-19 LAB
ALBUMIN SERPL BCP-MCNC: 4.2 G/DL (ref 3.5–5.2)
ALP SERPL-CCNC: 59 U/L (ref 55–135)
ALT SERPL W/O P-5'-P-CCNC: 21 U/L (ref 10–44)
ANION GAP SERPL CALC-SCNC: 8 MMOL/L (ref 8–16)
AST SERPL-CCNC: 20 U/L (ref 10–40)
BASOPHILS # BLD AUTO: 0.04 K/UL (ref 0–0.2)
BASOPHILS NFR BLD: 0.5 % (ref 0–1.9)
BILIRUB SERPL-MCNC: 0.4 MG/DL (ref 0.1–1)
BUN SERPL-MCNC: 13 MG/DL (ref 6–20)
CALCIUM SERPL-MCNC: 9.6 MG/DL (ref 8.7–10.5)
CHLORIDE SERPL-SCNC: 107 MMOL/L (ref 95–110)
CHOLEST SERPL-MCNC: 210 MG/DL (ref 120–199)
CHOLEST/HDLC SERPL: 5.7 {RATIO} (ref 2–5)
CO2 SERPL-SCNC: 25 MMOL/L (ref 23–29)
CREAT SERPL-MCNC: 1.3 MG/DL (ref 0.5–1.4)
DIFFERENTIAL METHOD BLD: ABNORMAL
EOSINOPHIL # BLD AUTO: 0.1 K/UL (ref 0–0.5)
EOSINOPHIL NFR BLD: 1.2 % (ref 0–8)
ERYTHROCYTE [DISTWIDTH] IN BLOOD BY AUTOMATED COUNT: 12.7 % (ref 11.5–14.5)
EST. GFR  (NO RACE VARIABLE): >60 ML/MIN/1.73 M^2
ESTIMATED AVG GLUCOSE: 100 MG/DL (ref 68–131)
GLUCOSE SERPL-MCNC: 91 MG/DL (ref 70–110)
HBA1C MFR BLD: 5.1 % (ref 4–5.6)
HCT VFR BLD AUTO: 42.3 % (ref 40–54)
HDLC SERPL-MCNC: 37 MG/DL (ref 40–75)
HDLC SERPL: 17.6 % (ref 20–50)
HGB BLD-MCNC: 14.4 G/DL (ref 14–18)
IMM GRANULOCYTES # BLD AUTO: 0.02 K/UL (ref 0–0.04)
IMM GRANULOCYTES NFR BLD AUTO: 0.3 % (ref 0–0.5)
LDLC SERPL CALC-MCNC: 144.8 MG/DL (ref 63–159)
LYMPHOCYTES # BLD AUTO: 1.5 K/UL (ref 1–4.8)
LYMPHOCYTES NFR BLD: 18.9 % (ref 18–48)
MCH RBC QN AUTO: 31.4 PG (ref 27–31)
MCHC RBC AUTO-ENTMCNC: 34 G/DL (ref 32–36)
MCV RBC AUTO: 92 FL (ref 82–98)
MONOCYTES # BLD AUTO: 0.6 K/UL (ref 0.3–1)
MONOCYTES NFR BLD: 7.1 % (ref 4–15)
NEUTROPHILS # BLD AUTO: 5.6 K/UL (ref 1.8–7.7)
NEUTROPHILS NFR BLD: 72 % (ref 38–73)
NONHDLC SERPL-MCNC: 173 MG/DL
NRBC BLD-RTO: 0 /100 WBC
PLATELET # BLD AUTO: 282 K/UL (ref 150–450)
PMV BLD AUTO: 10.5 FL (ref 9.2–12.9)
POTASSIUM SERPL-SCNC: 4.1 MMOL/L (ref 3.5–5.1)
PROT SERPL-MCNC: 7.1 G/DL (ref 6–8.4)
RBC # BLD AUTO: 4.58 M/UL (ref 4.6–6.2)
SODIUM SERPL-SCNC: 140 MMOL/L (ref 136–145)
TRIGL SERPL-MCNC: 141 MG/DL (ref 30–150)
TSH SERPL DL<=0.005 MIU/L-ACNC: 1.24 UIU/ML (ref 0.4–4)
WBC # BLD AUTO: 7.78 K/UL (ref 3.9–12.7)

## 2024-04-19 PROCEDURE — 85025 COMPLETE CBC W/AUTO DIFF WBC: CPT | Performed by: INTERNAL MEDICINE

## 2024-04-19 PROCEDURE — 80053 COMPREHEN METABOLIC PANEL: CPT | Performed by: INTERNAL MEDICINE

## 2024-04-19 PROCEDURE — 99999 PR PBB SHADOW E&M-EST. PATIENT-LVL III: CPT | Mod: PBBFAC,,, | Performed by: INTERNAL MEDICINE

## 2024-04-19 PROCEDURE — 83036 HEMOGLOBIN GLYCOSYLATED A1C: CPT | Performed by: INTERNAL MEDICINE

## 2024-04-19 PROCEDURE — 36415 COLL VENOUS BLD VENIPUNCTURE: CPT | Performed by: INTERNAL MEDICINE

## 2024-04-19 PROCEDURE — 80061 LIPID PANEL: CPT | Performed by: INTERNAL MEDICINE

## 2024-04-19 PROCEDURE — 1159F MED LIST DOCD IN RCRD: CPT | Mod: CPTII,S$GLB,, | Performed by: INTERNAL MEDICINE

## 2024-04-19 PROCEDURE — 3074F SYST BP LT 130 MM HG: CPT | Mod: CPTII,S$GLB,, | Performed by: INTERNAL MEDICINE

## 2024-04-19 PROCEDURE — 3008F BODY MASS INDEX DOCD: CPT | Mod: CPTII,S$GLB,, | Performed by: INTERNAL MEDICINE

## 2024-04-19 PROCEDURE — 3078F DIAST BP <80 MM HG: CPT | Mod: CPTII,S$GLB,, | Performed by: INTERNAL MEDICINE

## 2024-04-19 PROCEDURE — 99395 PREV VISIT EST AGE 18-39: CPT | Mod: S$GLB,,, | Performed by: INTERNAL MEDICINE

## 2024-04-19 PROCEDURE — 84443 ASSAY THYROID STIM HORMONE: CPT | Performed by: INTERNAL MEDICINE

## 2024-04-19 NOTE — PROGRESS NOTES
Subjective:       Patient ID: Zechariah Potter is a 28 y.o. male.    Chief Complaint: Annual Exam      HPI  Zechariah Potter is a 28 y.o. year old male with ADHD, migraine headache, anxiety, OCD, PDD presents for annual exam. At baseline health, no new complaints.    Review of Systems   Constitutional:  Positive for unexpected weight change. Negative for activity change.   HENT:  Negative for hearing loss, rhinorrhea and trouble swallowing.    Eyes:  Negative for discharge and visual disturbance.   Respiratory:  Negative for chest tightness and wheezing.    Cardiovascular:  Negative for chest pain and palpitations.   Gastrointestinal:  Negative for blood in stool, constipation, diarrhea and vomiting.   Endocrine: Negative for polydipsia and polyuria.   Genitourinary:  Negative for difficulty urinating, hematuria and urgency.   Musculoskeletal:  Negative for arthralgias, joint swelling and neck pain.   Neurological:  Positive for headaches. Negative for weakness.   Psychiatric/Behavioral:  Positive for dysphoric mood. Negative for confusion.          Past Medical History:   Diagnosis Date    Anxiety     Asthma     Headache(784.0)     OCD (obsessive compulsive disorder)     PDD (pervasive developmental disorder)         Prior to Admission medications    Medication Sig Start Date End Date Taking? Authorizing Provider   clindamycin (CLEOCIN T) 1 % external solution SMARTSI Topical Twice Daily 22  Yes Provider, Historical   dextroamphetamine-amphetamine (ADDERALL XR) 10 MG 24 hr capsule Take 10 mg by mouth every morning.   Yes Provider, Historical   lisdexamfetamine (VYVANSE) 70 MG capsule Take 70 mg by mouth. 1 Capsule Oral Every morning   Yes Provider, Historical   predniSONE (DELTASONE) 20 MG tablet Take 20 mg by mouth 2 (two) times daily. 10/3/22  Yes Provider, Historical   sertraline (ZOLOFT) 100 MG tablet Take 250 mg by mouth once daily. Take two pills daily   Yes Provider, Historical   topiramate (TOPAMAX)  "100 MG tablet Take 1 tablet (100 mg total) by mouth once daily. 2/19/24 2/18/25 Yes Erci Pace MD   ubrogepant (UBRELVY) 100 mg tablet Take 1 tablet (100 mg total) by mouth as needed for Migraine. If symptoms persist or return, may repeat dose after 2 hours. Maximum: 200 mg per 24 hours 11/30/23  Yes Baljit Talley III, MD   azithromycin (ZITHROMAX Z-LORETTA) 250 MG tablet Take 2 tablets (500 mg) on  Day 1,  followed by 1 tablet (250 mg) once daily on Days 2 through 5.  Patient not taking: Reported on 2/19/2024 12/13/23   Arnoldo Valdez MD   benzonatate (TESSALON PERLES) 100 MG capsule Take 2 capsules (200 mg total) by mouth 3 (three) times daily as needed for Cough.  Patient not taking: Reported on 2/19/2024 9/16/22   Katheryn Man NP   loratadine (CLARITIN) 10 mg tablet Take 1 tablet (10 mg total) by mouth once daily. 12/9/23 1/8/24  Arnoldo Valdez MD   sumatriptan (IMITREX) 100 MG tablet Take 100 mg by mouth every 2 (two) hours as needed for Migraine.  Patient not taking: Reported on 4/19/2024    ProviderAnthony   ubrogepant (UBRELVY) 50 mg tablet Take 1 50 mg tablet by mouth at onset of headache; if needed a second dose may be taken at least 2 hours after the initial dose; MAX dose 200 mg/24 hr  Patient not taking: Reported on 2/19/2024 8/21/23   Joshua Fishman MD        Past medical history, surgical history, and family medical history reviewed and updated as appropriate.    Medications and allergies reviewed.     Objective:          Vitals:    04/19/24 1445   BP: 110/66   BP Location: Right arm   Patient Position: Sitting   Pulse: 110   SpO2: 96%   Weight: 93.3 kg (205 lb 11 oz)   Height: 5' 11" (1.803 m)     Body mass index is 28.69 kg/m².  Physical Exam  Constitutional:       General: He is not in acute distress.     Appearance: He is well-developed.   HENT:      Head: Normocephalic and atraumatic.      Nose: Nose normal.   Eyes:      General: No scleral icterus.     Extraocular " Movements: Extraocular movements intact.   Neck:      Thyroid: No thyromegaly.      Vascular: No JVD.      Trachea: No tracheal deviation.   Cardiovascular:      Rate and Rhythm: Normal rate and regular rhythm.      Heart sounds: Normal heart sounds. No murmur heard.     No friction rub. No gallop.   Pulmonary:      Effort: Pulmonary effort is normal. No respiratory distress.      Breath sounds: Normal breath sounds. No wheezing or rales.   Abdominal:      General: Bowel sounds are normal. There is no distension.      Palpations: Abdomen is soft. There is no mass.      Tenderness: There is no abdominal tenderness.   Musculoskeletal:         General: No tenderness. Normal range of motion.      Cervical back: Normal range of motion and neck supple.   Lymphadenopathy:      Cervical: No cervical adenopathy.   Skin:     General: Skin is warm and dry.      Findings: No rash.   Neurological:      Mental Status: He is alert and oriented to person, place, and time.      Cranial Nerves: No cranial nerve deficit.      Deep Tendon Reflexes: Reflexes normal.   Psychiatric:         Behavior: Behavior normal.         Lab Results   Component Value Date    WBC 6.19 01/24/2023    HGB 15.1 01/24/2023    HCT 45.2 01/24/2023     01/24/2023    CHOL 212 (H) 01/24/2023    TRIG 87 01/24/2023    HDL 46 01/24/2023    ALT 24 01/24/2023    AST 22 01/24/2023     01/24/2023    K 4.4 01/24/2023     01/24/2023    CREATININE 0.9 01/24/2023    BUN 15 01/24/2023    CO2 25 01/24/2023    TSH 1.412 01/24/2023    HGBA1C 5.0 01/24/2023       Assessment:       1. Encounter for annual physical exam    2. Chronic migraine without aura without status migrainosus, not intractable    3. Attention deficit hyperactivity disorder (ADHD), predominantly inattentive type    4. Autism spectrum disorder    5. Laboratory examination ordered as part of a complete physical examination          Plan:     Zechariah was seen today for annual exam.    Diagnoses  and all orders for this visit:    Encounter for annual physical exam    Chronic migraine without aura without status migrainosus, not intractable  Comments:  saw neurology; topamax increased to 100 mg, still taking ubrelvy as needed. Dehydration has been an issue previously.  Orders:  -     CBC Auto Differential; Future  -     Comprehensive Metabolic Panel; Future    Attention deficit hyperactivity disorder (ADHD), predominantly inattentive type  Comments:  follows with pyschiatry on vyvanse and adderall 1-2 times a day.    Autism spectrum disorder  Comments:  symptoms stable.  Orders:  -     TSH; Future    Laboratory examination ordered as part of a complete physical examination  -     CBC Auto Differential; Future  -     Comprehensive Metabolic Panel; Future  -     TSH; Future  -     Hemoglobin A1C; Future  -     Lipid Panel; Future    Benign physical examination, no issues identified. Will obtain routine labwork and age appropriate health screenings.     Health maintenance reviewed with patient.     Follow up in about 1 year (around 4/19/2025).    Joshua Fishman MD  Internal Medicine / Primary Care  [facility]  4/19/2024

## 2024-04-23 DIAGNOSIS — G43.709 CHRONIC MIGRAINE WITHOUT AURA WITHOUT STATUS MIGRAINOSUS, NOT INTRACTABLE: ICD-10-CM

## 2024-04-24 RX ORDER — TOPIRAMATE 100 MG/1
100 TABLET, FILM COATED ORAL DAILY
Qty: 90 TABLET | Refills: 3 | Status: SHIPPED | OUTPATIENT
Start: 2024-04-24 | End: 2025-04-24

## 2024-06-10 ENCOUNTER — PATIENT MESSAGE (OUTPATIENT)
Dept: INTERNAL MEDICINE | Facility: CLINIC | Age: 29
End: 2024-06-10
Payer: COMMERCIAL

## 2024-10-02 ENCOUNTER — OFFICE VISIT (OUTPATIENT)
Dept: URGENT CARE | Facility: CLINIC | Age: 29
End: 2024-10-02
Payer: COMMERCIAL

## 2024-10-02 VITALS
WEIGHT: 192 LBS | RESPIRATION RATE: 20 BRPM | HEIGHT: 71 IN | SYSTOLIC BLOOD PRESSURE: 113 MMHG | OXYGEN SATURATION: 99 % | TEMPERATURE: 98 F | HEART RATE: 90 BPM | DIASTOLIC BLOOD PRESSURE: 78 MMHG | BODY MASS INDEX: 26.88 KG/M2

## 2024-10-02 DIAGNOSIS — S52.101A CLOSED FRACTURE OF PROXIMAL END OF RIGHT RADIUS, UNSPECIFIED FRACTURE MORPHOLOGY, INITIAL ENCOUNTER: Primary | ICD-10-CM
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
## 2024-10-02 DIAGNOSIS — M25.421 EFFUSION OF RIGHT ELBOW: ICD-10-CM

## 2024-10-02 DIAGNOSIS — M79.89 PAIN AND SWELLING OF FOREARM, RIGHT: ICD-10-CM

## 2024-10-02 DIAGNOSIS — S59.911A INJURY OF RIGHT FOREARM, INITIAL ENCOUNTER: ICD-10-CM

## 2024-10-02 DIAGNOSIS — M79.631 PAIN AND SWELLING OF FOREARM, RIGHT: ICD-10-CM

## 2024-10-02 PROCEDURE — 73090 X-RAY EXAM OF FOREARM: CPT | Mod: RT,S$GLB,, | Performed by: RADIOLOGY

## 2024-10-02 PROCEDURE — 99213 OFFICE O/P EST LOW 20 MIN: CPT | Mod: S$GLB,,, | Performed by: NURSE PRACTITIONER

## 2024-10-02 RX ORDER — DOXYCYCLINE HYCLATE 100 MG/1
100 TABLET, DELAYED RELEASE ORAL 2 TIMES DAILY
COMMUNITY

## 2024-10-02 RX ORDER — BUPROPION HYDROCHLORIDE 150 MG/1
150 TABLET ORAL EVERY MORNING
COMMUNITY
Start: 2024-06-04

## 2024-10-02 RX ORDER — IBUPROFEN 800 MG/1
800 TABLET ORAL 3 TIMES DAILY
Qty: 15 TABLET | Refills: 0 | Status: SHIPPED | OUTPATIENT
Start: 2024-10-02

## 2024-10-02 NOTE — PROGRESS NOTES
"Subjective:      Patient ID: Zechariah Potter is a 28 y.o. male.    Vitals:  height is 5' 11" (1.803 m) and weight is 87.1 kg (192 lb). His oral temperature is 98.4 °F (36.9 °C). His blood pressure is 113/78 and his pulse is 90. His respiration is 20 and oxygen saturation is 99%.     Chief Complaint: Arm Injury    Patient presents with right arm pain from a fall about 45 minutes ago. Hurts to move.    28 year old male presents to clinic with reports of right forearm pain after a slip and fall 45 minutes prior to clinic arrival.     Arm Injury   His dominant hand is their right hand. The incident occurred less than 1 hour ago. The injury mechanism was a fall. The quality of the pain is described as aching. The pain radiates to the right arm. The pain is at a severity of 7/10. The pain is moderate. The pain has been Constant since the incident. Associated symptoms include muscle weakness and tingling. Pertinent negatives include no numbness. He has tried nothing for the symptoms. The treatment provided no relief.       Musculoskeletal:  Positive for pain, trauma and muscle ache. Negative for joint pain, joint swelling, abnormal ROM of joint and muscle cramps.   Skin:  Negative for erythema.   Neurological:  Negative for numbness and tingling.      Objective:     Physical Exam   Constitutional: He is oriented to person, place, and time. He appears well-developed.   HENT:   Head: Normocephalic and atraumatic. Head is without abrasion, without contusion and without laceration.   Ears:   Right Ear: External ear normal.   Left Ear: External ear normal.   Nose: Nose normal.   Mouth/Throat: Oropharynx is clear and moist and mucous membranes are normal.   Eyes: EOM and lids are normal. Extraocular movement intact   Neck: Trachea normal and phonation normal. Neck supple.   Cardiovascular: Normal rate.   Pulmonary/Chest: Effort normal. No stridor. No respiratory distress.   Musculoskeletal: Normal range of motion.         " General: Normal range of motion.      Right forearm: He exhibits tenderness and swelling. He exhibits no bony tenderness, no edema, no deformity and no laceration.        Arms:       Right hand: He exhibits normal capillary refill. Decreased sensation is not present in the ulnar distribution, is not present in the medial distribution and is not present in the radial distribution.   Neurological: He is alert and oriented to person, place, and time.   Skin: Skin is warm, dry, intact and no rash. Capillary refill takes less than 2 seconds. No abrasion, No burn, No bruising, No erythema and No ecchymosis   Psychiatric: His speech is normal and behavior is normal. Judgment and thought content normal.   Nursing note and vitals reviewed.      Assessment:     1. Closed fracture of proximal end of right radius, unspecified fracture morphology, initial encounter    2. Injury of right forearm, initial encounter    3. Pain and swelling of forearm, right    4. Effusion of right elbow    5. Fall on same level from slipping, tripping and stumbling with subsequent striking against other object, initial encounter        Plan:       Closed fracture of proximal end of right radius, unspecified fracture morphology, initial encounter  -     SLING FOR HOME USE  -     Ambulatory referral/consult to Orthopedics    Injury of right forearm, initial encounter  -     XR FOREARM RIGHT; Future; Expected date: 10/02/2024    Pain and swelling of forearm, right  -     XR FOREARM RIGHT; Future; Expected date: 10/02/2024  -     BANDAGE ELASTIC 3IN ACE  -     SLING FOR HOME USE  -     ibuprofen (ADVIL,MOTRIN) 800 MG tablet; Take 1 tablet (800 mg total) by mouth 3 (three) times daily.  Dispense: 15 tablet; Refill: 0    Effusion of right elbow  -     SLING FOR HOME USE  -     Ambulatory referral/consult to Orthopedics    Fall on same level from slipping, tripping and stumbling with subsequent striking against other object, initial encounter    XR  FOREARM RIGHT    Result Date: 10/2/2024  EXAMINATION: XR FOREARM RIGHT CLINICAL HISTORY: Unspecified injury of right forearm, initial encounter TECHNIQUE: AP and lateral views of the right forearm were performed. COMPARISON: None FINDINGS: Bones are well mineralized.  Overall alignment is within normal limits.  There is acute appearing fracture with minimal displacement and minimal depression involving the proximal radial head with fracture planes extending to the joint space.  Raised anterior fat pad suggesting associated elbow joint effusion.  No dislocation or destructive osseous process.  Remainder of the forearm is intact.  No subcutaneous emphysema or radiopaque foreign body.     Proximal radial head acute appearing fracture with intra-articular extension and suspected elbow joint effusion, as above. Electronically signed by: Gilmar Love MD Date:    10/02/2024 Time:    15:54      Reviewed abnormal xray with patient who acknowledged results. Discussed  Diagnosis and treatment plan with patient who verbalized understanding and agrees with plan of care.  Advised on the need to follow-up appointment with Orthopedics regarding fracture on tomorrow. Patient given educational handouts supporting this diagnosis as well.  Ace warp, ice, sling applied, patient tolerated well.  Patient denies any further questions or concerns at this time.  Patient exits exam room in no acute distress.     Wear your brace or splint to support your wrist. You may also get a sling to support your arm. You may need to have surgery or a cast after the swelling goes down.  Prop your hand on pillows, keeping it raised above the level of your heart. This may help lessen pain and swelling.  You may want to take medicine like ibuprofen or naproxen for swelling and pain. These are nonsteroidal anti-inflammatory drugs (NSAIDS). You might also have gotten a prescription for stronger pain medicines to take for a short time. If so, be sure to follow  the instructions for taking them.  Ice may help you ease pain and swelling.  Place an ice pack or a bag of frozen vegetables wrapped in a towel over the painful part. Never put ice right on the skin. Do not leave the ice on more than 10 to 15 minutes at a time. Use for the first 24 to 48 hours after an injury    Please follow up with the orthopedic specialist as scheduled on tomorrow for 8:00 am.    If you  smoke, please stop smoking.

## 2024-10-02 NOTE — PATIENT INSTRUCTIONS
Wear your brace or splint to support your wrist. You may also get a sling to support your arm. You may need to have surgery or a cast after the swelling goes down.  Prop your hand on pillows, keeping it raised above the level of your heart. This may help lessen pain and swelling.  You may want to take medicine like ibuprofen or naproxen for swelling and pain. These are nonsteroidal anti-inflammatory drugs (NSAIDS). You might also have gotten a prescription for stronger pain medicines to take for a short time. If so, be sure to follow the instructions for taking them.  Ice may help you ease pain and swelling.  Place an ice pack or a bag of frozen vegetables wrapped in a towel over the painful part. Never put ice right on the skin. Do not leave the ice on more than 10 to 15 minutes at a time. Use for the first 24 to 48 hours after an injury    Please follow up with the orthopedic specialist as scheduled on tomorrow for 8:00 am.    If you  smoke, please stop smoking.

## 2024-10-03 ENCOUNTER — OFFICE VISIT (OUTPATIENT)
Dept: ORTHOPEDICS | Facility: CLINIC | Age: 29
End: 2024-10-03
Payer: COMMERCIAL

## 2024-10-03 VITALS — BODY MASS INDEX: 28.47 KG/M2 | HEIGHT: 71 IN | WEIGHT: 203.38 LBS

## 2024-10-03 DIAGNOSIS — M25.421 EFFUSION OF RIGHT ELBOW: ICD-10-CM

## 2024-10-03 DIAGNOSIS — S52.101A CLOSED FRACTURE OF PROXIMAL END OF RIGHT RADIUS, UNSPECIFIED FRACTURE MORPHOLOGY, INITIAL ENCOUNTER: Primary | ICD-10-CM

## 2024-10-03 PROCEDURE — 99999 PR PBB SHADOW E&M-EST. PATIENT-LVL III: CPT | Mod: PBBFAC,,, | Performed by: PHYSICIAN ASSISTANT

## 2024-10-03 NOTE — PROGRESS NOTES
Chief Complaint & History of Present Illness:    Zechariah Potter is a New patient 28 y.o. male who is seen here today with a complaint of    Chief Complaint   Patient presents with    Right Forearm - Pain, Injury    .  Patient is here today is a follow-up visit from urgent care visit yesterday suffering a proximal radial head fracture after fall from standing height.  X-rays at that time demonstrated minimally displaced and depressed fracture of the radial head involving the joint surface.  Patient was placed in a sling and is here today for continuing care treatment.  Reports he has had little pain in or about the elbow but does notice a decrease in range of motion secondary to pain particularly with full extension  On a scale of 1-10, with 10 being worst pain imaginable, he rates this pain as 1 on good days and 4 on bad days.  he describes the pain as tender and sore.    Review of patient's allergies indicates:   Allergen Reactions    Cephalosporins      Other reaction(s): Hives    Penicillins      Other reaction(s): Hives         Current Outpatient Medications   Medication Sig Dispense Refill    buPROPion (WELLBUTRIN XL) 150 MG TB24 tablet Take 150 mg by mouth every morning.      dextroamphetamine-amphetamine (ADDERALL XR) 10 MG 24 hr capsule Take 10 mg by mouth every morning.      doxycycline (DORYX) 100 MG EC tablet Take 100 mg by mouth 2 (two) times daily.      ibuprofen (ADVIL,MOTRIN) 800 MG tablet Take 1 tablet (800 mg total) by mouth 3 (three) times daily. 15 tablet 0    lisdexamfetamine (VYVANSE) 70 MG capsule Take 70 mg by mouth. 1 Capsule Oral Every morning      sertraline (ZOLOFT) 100 MG tablet Take 250 mg by mouth once daily. Take two pills daily      topiramate (TOPAMAX) 100 MG tablet Take 1 tablet (100 mg total) by mouth once daily. 90 tablet 3    ubrogepant (UBRELVY) 100 mg tablet Take 1 tablet (100 mg total) by mouth as needed for Migraine. If symptoms persist or return, may repeat dose after 2  "hours. Maximum: 200 mg per 24 hours 10 tablet 12    loratadine (CLARITIN) 10 mg tablet Take 1 tablet (10 mg total) by mouth once daily. 30 tablet 0     No current facility-administered medications for this visit.       Past Medical History:   Diagnosis Date    Anxiety     Asthma     Headache(784.0)     OCD (obsessive compulsive disorder)     PDD (pervasive developmental disorder)        No past surgical history on file.    Vital Signs (Most Recent)  There were no vitals filed for this visit.        Review of Systems:  ROS:  Constitutional: no fever or chills  Eyes: no visual changes  ENT: no nasal congestion or sore throat  Respiratory: no cough or shortness of breath  Cardiovascular: no chest pain or palpitations  Gastrointestinal: no nausea or vomiting, tolerating diet  Genitourinary: no hematuria or dysuria  Integument/Breast: no rash or pruritis  Hematologic/Lymphatic: no easy bruising or lymphadenopathy  Musculoskeletal: no arthralgias or myalgias  Neurological: no seizures or tremors, autism spectrum disorder, chronic migraine without aura  Behavioral/Psych: no auditory or visual hallucinations, ADHD  Endocrine: no heat or cold intolerance                OBJECTIVE:     PHYSICAL EXAM:  Height: 5' 11" (180.3 cm) Weight: 92.3 kg (203 lb 6 oz), General Appearance: Well nourished, well developed, in no acute distress.  Neurological: Mood & affect are normal.  right  Elbow:   History of injury: direct injury, fall on hand  Pain: Description: moderate and intermittent  Night pain: no  Rest pain: no  Quality: sharp  Location: diffuse  Radiates to: arm  Exacerbating factors: activity  Alleviating factors: rest and sling  Mass/swelling: none  Neurological complaints: none  Vascular complaints: none  soft tissue tenderness and swelling at the antecubital fossa, radial pulse normal, negative Cozen  ROM: flexion arc 5-140, pronation 80, and supination 90  Strength:  Not tested secondary to " fracture      RADIOGRAPHS:  Review of x-rays from yesterday demonstrate minimally displaced depressed fracture of radial head with extension into the joint space    ASSESSMENT/PLAN:       ICD-10-CM ICD-9-CM   1. Effusion of right elbow  M25.421 719.02   2. Closed fracture of proximal end of right radius, unspecified fracture morphology, initial encounter  S52.101A 813.07       Plan: We discussed with the patient at length all the different treatment options available for his elbow including anti-inflammatories, acetaminophen, rest, ice, physical therapy to include strengthening, range of motion exercise,  splinting,  occasional cortisone injections for temporary relief,  or possible surgical interventions.    RT fiberglass LAC application ordered by GLORIA Mota. Skin intact with no redness or bruising.   Consult sports medicine for evaluation and consideration of surgical interventions  Continue anti-inflammatory medications as prescribed  Follow-up p.r.n.

## 2024-10-04 ENCOUNTER — OFFICE VISIT (OUTPATIENT)
Dept: SPORTS MEDICINE | Facility: CLINIC | Age: 29
End: 2024-10-04
Payer: COMMERCIAL

## 2024-10-04 ENCOUNTER — HOSPITAL ENCOUNTER (OUTPATIENT)
Dept: RADIOLOGY | Facility: HOSPITAL | Age: 29
Discharge: HOME | End: 2024-10-04
Attending: ORTHOPAEDIC SURGERY
Payer: COMMERCIAL

## 2024-10-04 VITALS
BODY MASS INDEX: 28.37 KG/M2 | WEIGHT: 202.63 LBS | HEIGHT: 71 IN | HEART RATE: 99 BPM | SYSTOLIC BLOOD PRESSURE: 143 MMHG | DIASTOLIC BLOOD PRESSURE: 81 MMHG

## 2024-10-04 DIAGNOSIS — S52.124A CLOSED NONDISPLACED FRACTURE OF HEAD OF RIGHT RADIUS, INITIAL ENCOUNTER: Primary | ICD-10-CM

## 2024-10-04 DIAGNOSIS — M25.521 RIGHT ELBOW PAIN: ICD-10-CM

## 2024-10-04 PROCEDURE — 73080 X-RAY EXAM OF ELBOW: CPT | Mod: 26,RT,, | Performed by: RADIOLOGY

## 2024-10-04 PROCEDURE — 1159F MED LIST DOCD IN RCRD: CPT | Mod: CPTII,S$GLB,, | Performed by: ORTHOPAEDIC SURGERY

## 2024-10-04 PROCEDURE — 3077F SYST BP >= 140 MM HG: CPT | Mod: CPTII,S$GLB,, | Performed by: ORTHOPAEDIC SURGERY

## 2024-10-04 PROCEDURE — 99203 OFFICE O/P NEW LOW 30 MIN: CPT | Mod: S$GLB,,, | Performed by: ORTHOPAEDIC SURGERY

## 2024-10-04 PROCEDURE — 73080 X-RAY EXAM OF ELBOW: CPT | Mod: TC,RT

## 2024-10-04 PROCEDURE — 3044F HG A1C LEVEL LT 7.0%: CPT | Mod: CPTII,S$GLB,, | Performed by: ORTHOPAEDIC SURGERY

## 2024-10-04 PROCEDURE — 99999 PR PBB SHADOW E&M-EST. PATIENT-LVL III: CPT | Mod: PBBFAC,,, | Performed by: ORTHOPAEDIC SURGERY

## 2024-10-04 PROCEDURE — 3079F DIAST BP 80-89 MM HG: CPT | Mod: CPTII,S$GLB,, | Performed by: ORTHOPAEDIC SURGERY

## 2024-10-04 PROCEDURE — 3008F BODY MASS INDEX DOCD: CPT | Mod: CPTII,S$GLB,, | Performed by: ORTHOPAEDIC SURGERY

## 2024-10-04 NOTE — PROGRESS NOTES
ESTABLISHED PATIENT    HISTORY OF PRESENT ILLNESS   28 y.o. Male with a history of right elbow pain who presents today with his mom. He loves to play golf. He is referred from Marcell Mota. He states on 10/2/24 he slipped and landed with an outstretch hand. He presented at Urgent care and x-rays were taken. On 10/3/24 he saw Marcell Mota put him into a long arm cast.     - mechanical symptoms, - instability    Is affecting ADLs.  Pain is 0/10 at it's worst.        PAST MEDICAL HISTORY    Past Medical History:   Diagnosis Date    Anxiety     Asthma     Headache(784.0)     OCD (obsessive compulsive disorder)     PDD (pervasive developmental disorder)        PAST SURGICAL HISTORY     History reviewed. No pertinent surgical history.    FAMILY HISTORY    Family History   Problem Relation Name Age of Onset    Migraines Mother         SOCIAL HISTORY    Social History     Socioeconomic History    Marital status: Single   Tobacco Use    Smoking status: Never    Smokeless tobacco: Never   Substance and Sexual Activity    Alcohol use: Yes     Comment: occ    Drug use: No    Sexual activity: Never     Social Drivers of Health     Financial Resource Strain: Patient Declined (2/16/2024)    Overall Financial Resource Strain (CARDIA)     Difficulty of Paying Living Expenses: Patient declined   Food Insecurity: Food Insecurity Present (2/16/2024)    Hunger Vital Sign     Worried About Running Out of Food in the Last Year: Often true     Ran Out of Food in the Last Year: Sometimes true   Transportation Needs: No Transportation Needs (2/16/2024)    PRAPARE - Transportation     Lack of Transportation (Medical): No     Lack of Transportation (Non-Medical): No   Physical Activity: Insufficiently Active (2/16/2024)    Exercise Vital Sign     Days of Exercise per Week: 2 days     Minutes of Exercise per Session: 40 min   Stress: Stress Concern Present (2/16/2024)    [employer] - Occupational Stress  Questionnaire     Feeling of Stress : To some extent   Housing Stability: High Risk (2/16/2024)    Housing Stability Vital Sign     Unable to Pay for Housing in the Last Year: Patient declined     Number of Places Lived in the Last Year: 20     Unstable Housing in the Last Year: Yes       MEDICATIONS      Current Outpatient Medications:     buPROPion (WELLBUTRIN XL) 150 MG TB24 tablet, Take 150 mg by mouth every morning., Disp: , Rfl:     dextroamphetamine-amphetamine (ADDERALL XR) 10 MG 24 hr capsule, Take 10 mg by mouth every morning., Disp: , Rfl:     doxycycline (DORYX) 100 MG EC tablet, Take 100 mg by mouth 2 (two) times daily., Disp: , Rfl:     ibuprofen (ADVIL,MOTRIN) 800 MG tablet, Take 1 tablet (800 mg total) by mouth 3 (three) times daily., Disp: 15 tablet, Rfl: 0    lisdexamfetamine (VYVANSE) 70 MG capsule, Take 70 mg by mouth. 1 Capsule Oral Every morning, Disp: , Rfl:     sertraline (ZOLOFT) 100 MG tablet, Take 250 mg by mouth once daily. Take two pills daily, Disp: , Rfl:     topiramate (TOPAMAX) 100 MG tablet, Take 1 tablet (100 mg total) by mouth once daily., Disp: 90 tablet, Rfl: 3    ubrogepant (UBRELVY) 100 mg tablet, Take 1 tablet (100 mg total) by mouth as needed for Migraine. If symptoms persist or return, may repeat dose after 2 hours. Maximum: 200 mg per 24 hours, Disp: 10 tablet, Rfl: 12    loratadine (CLARITIN) 10 mg tablet, Take 1 tablet (10 mg total) by mouth once daily., Disp: 30 tablet, Rfl: 0    ALLERGIES     Review of patient's allergies indicates:   Allergen Reactions    Cephalosporins      Other reaction(s): Hives    Penicillins      Other reaction(s): Hives         REVIEW OF SYSTEMS   Constitution: Negative. Negative for chills, fever and night sweats.   HENT: Negative for congestion and headaches.    Eyes: Negative for blurred vision, left vision loss and right vision loss.   Cardiovascular: Negative for chest pain and syncope.   Respiratory: Negative for cough and shortness of  "breath.    Endocrine: Negative for polydipsia, polyphagia and polyuria.   Hematologic/Lymphatic: Negative for bleeding problem. Does not bruise/bleed easily.   Skin: Negative for dry skin, itching and rash.   Musculoskeletal: Negative for falls. Positive for right elbow pain   Gastrointestinal: Negative for abdominal pain and bowel incontinence.   Genitourinary: Negative for bladder incontinence and nocturia.   Neurological: Negative for disturbances in coordination, loss of balance and seizures.   Psychiatric/Behavioral: Negative for depression. The patient does not have insomnia.    Allergic/Immunologic: Negative for hives and persistent infections.     PHYSICAL EXAMINATION    Vitals: BP (!) 143/81   Pulse 99   Ht 5' 11" (1.803 m)   Wt 91.9 kg (202 lb 9.6 oz)   BMI 28.26 kg/m²     General: The patient appears active and healthy with no apparent physical problems.  No disturbance of mood or affect is demonstrated. Alert and Oriented.    HEENT: Eyes normal, pupils equally round, nose normal.    Resp: Equal and symmetrical chest rises. No wheezing    CV: Regular rate    Neck: Supple; nonpainful range of motion.    Extremities: no cyanosis, clubbing, edema, or diffuse swelling.  Palpable pulses, good capillary refill of the digits.  No coolness, discoloration, edema or obvious varicosities.    Skin: no lesions noted.    Lymphatic: no detected adenopathy in the upper or lower extremities.    Neurologic: normal mental status, normal reflexes, normal gait and balance.  Patient is alert and oriented to person, place and time.  No flaccidity or spasticity is noted.  No motor or sensory deficits are noted.  Light touch is intact    Orthopaedic: Elbow Exam-RIGHT    Inspection: Normal skin color and appearance, no ecchymosis, mild swelling, no scars.  There is a normal carrying angle.      ROM: 0° - 135+° with 80° supination and 80° pronation.       Palpation: No tenderness at the medial epicondyle, olecranon, or lateral " epicondyle.  +slight tenderness at radial head     The wrist flexor-pronator muscle group is nontender.    The wrist extensor mobile wad is nontender.    Strength: Flexor and extensor motor strength is 5/5.      Stability: Varus and valgus stress reveals no instability. No Guarding    Neuro Reflexes are 2/2 biceps, brachioradialis and triceps. Sensation intact    Test: - Milking maneuver - Moving valgus stress test - VEO - Thinker's - Tinel's Sign - Ulnar nerve subluxation - Hook Test - Pain with resisted wrist ext - Pain with long finger ext      IMAGING    X-Ray Elbow Complete Right  Narrative: EXAMINATION:  XR ELBOW COMPLETE 3 VIEW RIGHT    CLINICAL HISTORY:  . Pain in right elbow    TECHNIQUE:  AP, lateral, and oblique views of the right elbow were performed.    COMPARISON:  Fore arm study of October 2, 2024    FINDINGS:  Reconfirmed now subacute intra-articular minimally depressed the radial head fracture.  The appearance of the fracture site and the position and alignment of fracture fragments without detrimental changes.  No new fractures.  Reconfirmed joint effusion.  No areas of obvious soft tissue swelling.  Impression: As above    Electronically signed by: Shiv Valera  Date:    10/04/2024  Time:    10:31        IMPRESSION       ICD-10-CM ICD-9-CM   1. Closed nondisplaced fracture of head of right radius, initial encounter  S52.124A 813.05   2. Right elbow pain  M25.521 719.42       MEDICATIONS PRESCRIBED      none    RECOMMENDATIONS     I believe that we can proceed with conservative treatement, without casting. He needs to continue to work on terminal extension on his own.  He is able to put in golf right now, but can work into full swings in the next 3-4 weeks.   RTC in 6 weeks with repeat x-rays of the elbow      All questions were answered, pt will contact us for questions or concerns in the interim.

## 2024-10-07 ENCOUNTER — TELEPHONE (OUTPATIENT)
Dept: SPORTS MEDICINE | Facility: CLINIC | Age: 29
End: 2024-10-07
Payer: COMMERCIAL

## 2024-10-07 NOTE — TELEPHONE ENCOUNTER
Pt called to ask if we think that he needs formal physical therapy. I stated that he does not at this time and to keep working his passive range of motion. If in a week he believes he needs formal physical therapy, then we can order that for him. Patient understood.

## 2024-10-12 ENCOUNTER — PATIENT MESSAGE (OUTPATIENT)
Dept: SPORTS MEDICINE | Facility: CLINIC | Age: 29
End: 2024-10-12
Payer: COMMERCIAL

## 2024-10-14 DIAGNOSIS — S52.124A CLOSED NONDISPLACED FRACTURE OF HEAD OF RIGHT RADIUS, INITIAL ENCOUNTER: Primary | ICD-10-CM

## 2024-10-14 DIAGNOSIS — M25.521 RIGHT ELBOW PAIN: ICD-10-CM

## 2024-10-15 ENCOUNTER — CLINICAL SUPPORT (OUTPATIENT)
Dept: REHABILITATION | Facility: OTHER | Age: 29
End: 2024-10-15
Payer: COMMERCIAL

## 2024-10-15 DIAGNOSIS — S52.124A CLOSED NONDISPLACED FRACTURE OF HEAD OF RIGHT RADIUS, INITIAL ENCOUNTER: ICD-10-CM

## 2024-10-15 DIAGNOSIS — M25.521 RIGHT ELBOW PAIN: ICD-10-CM

## 2024-10-15 DIAGNOSIS — M25.531 PAIN IN RIGHT WRIST: ICD-10-CM

## 2024-10-15 DIAGNOSIS — M25.631 STIFFNESS OF RIGHT WRIST JOINT: ICD-10-CM

## 2024-10-15 DIAGNOSIS — M25.421 PAIN AND SWELLING OF RIGHT ELBOW: Primary | ICD-10-CM

## 2024-10-15 DIAGNOSIS — G43.709 CHRONIC MIGRAINE WITHOUT AURA WITHOUT STATUS MIGRAINOSUS, NOT INTRACTABLE: ICD-10-CM

## 2024-10-15 DIAGNOSIS — M25.521 PAIN AND SWELLING OF RIGHT ELBOW: Primary | ICD-10-CM

## 2024-10-15 DIAGNOSIS — M25.621 STIFFNESS OF RIGHT ELBOW JOINT: ICD-10-CM

## 2024-10-15 PROCEDURE — 97530 THERAPEUTIC ACTIVITIES: CPT | Mod: PN | Performed by: OCCUPATIONAL THERAPIST

## 2024-10-15 PROCEDURE — 97165 OT EVAL LOW COMPLEX 30 MIN: CPT | Mod: PN | Performed by: OCCUPATIONAL THERAPIST

## 2024-10-15 RX ORDER — TOPIRAMATE 100 MG/1
100 TABLET, FILM COATED ORAL DAILY
Qty: 90 TABLET | Refills: 3 | Status: SHIPPED | OUTPATIENT
Start: 2024-10-15 | End: 2025-10-15

## 2024-10-15 NOTE — PLAN OF CARE
[facility]  Occupational Therapy Initial Evaluation     Name: Zechariah Potter  [facility] Number: [id number]    Therapy Diagnosis:   Encounter Diagnoses   Name Primary?    Right elbow pain     Closed nondisplaced fracture of head of right radius, initial encounter     Pain and swelling of right elbow Yes    Pain in right wrist     Stiffness of right elbow joint     Stiffness of right wrist joint      Physician: Arsh Coleman MD    Physician Orders: Eval and Treat  Medical Diagnosis:   M25.521 (ICD-10-CM) - Right elbow pain   S52.124A (ICD-10-CM) - Closed nondisplaced fracture of head of right radius, initial encounter     Date of Injury: 10/2/24  Evaluation Date: 10/15/2024  Insurance Authorization Period Expiration: 12/31/24  Plan of Care Certification Period: 1/3/25  Date of Return to MD: 11/15/24  Visit # / Visits authorized: 1 / 1  FOTO: 0/3    Precautions:  Standard    Time In: 9:15 am  Time Out: 10:05 am  Total Appointment Time (timed & untimed codes): 50 minutes    Subjective     Date of Onset: 10/2/24    History of Current Condition/Mechanism of Injury: 28 y.o. Male with a history of right elbow pain who presents today with his mom. He loves to play golf. He is referred from Marcell Mota. He states on 10/2/24 he slipped and landed with an outstretch hand. He presented at Urgent care and x-rays were taken. On 10/3/24 he saw Marcell Mota put him into a long arm cast. Patient was referred to Orthopaedics and then on to Occupational Therapy. He presents with c/o pain and stiffness in R elbow and wrist.    Involved Side: Right  Dominant Side: Right    Mechanism of Injury: Fall  Surgical Procedure: n/a  Imaging: x-ray Reconfirmed now subacute intra-articular minimally depressed the radial head fracture. The appearance of the fracture site and the position and alignment of fracture fragments without detrimental changes. No new fractures. Reconfirmed joint effusion. No areas of obvious soft  "tissue swelling.     Prior Therapy: none    Pain:  Functional Pain Scale Rating 0-10:   5/10 on average  2/10 at best  8/10 at worst  Location: R wrist, forearm and elbow  Description: Aching and Dull  Aggravating Factors: forearm rotation, brushing teeth, etc  Easing Factors:  ibuprofen, rest, ice    Occupation:  n/a  Working presently: unemployed  Duties: n/a    Functional Limitations/Social History:    Previous functional status includes: Independent with all ADLs.     Current Functional Status   Home/Living environment: lives with their family      Limitation of Functional Status as follows:   ADLs/IADLs:     - Feeding: ind    - Bathing: ind    - Dressing/Grooming: with difficulty    - Home Management: ind    - Driving: ind     Leisure: golf, workout, assists at edo    Patient's Goals for Therapy: return to prior level of function    Past Medical History/Physical Systems Review:   Zechariah Potter  has a past medical history of Anxiety, Asthma, Headache(784.0), OCD (obsessive compulsive disorder), and PDD (pervasive developmental disorder).    Zechariah Potter  has no past surgical history on file.    Zechariah has a current medication list which includes the following prescription(s): bupropion, dextroamphetamine-amphetamine, doxycycline, ibuprofen, lisdexamfetamine, loratadine, sertraline, topiramate, and ubrelvy.    Review of patient's allergies indicates:   Allergen Reactions    Cephalosporins      Other reaction(s): Hives    Penicillins      Other reaction(s): Hives        Objective     Observation/Appearance: mild bruising and swelling on ulnar volar R forearm and elbow; tenderness palpated throughout R wrist and forearm    Edema. Measured in centimeters.   10/15/2024 10/15/2024      Left Right     Wrist Crease 17.4 17.3     2" below elbow crease 28.0 28.6     Elbow Crease 27.5 28.8         Elbow and Wrist ROM. Measured in degrees.   10/15/2024 10/15/2024      Left Right     Elbow Ext/Flex 0/140 " -30/125     Supination/Pronation 70/75 70/65     Wrist Ext/Flex 65/75 65/75     Wrist RD/UD 20/35 20/35        Strength (Dynamometer) and Pinch Strength (Pinch Gauge)  Measured in pounds.   10/15/2024 10/15/2024      Left Right     Rung II 72.8 24.9     Key Pinch       3pt Pinch         Sensation: WNLs    CMS Impairment/Limitation/Restriction for FOTO Elbow Survey    Therapist reviewed FOTO scores for Zechariah Potter on 10/15/2024.   FOTO documents entered into [employer] - see Media section.    Limitation Score: unavailable%         Treatment     Total Treatment time (time-based codes) separate from Evaluation: 15 minutes    ZECHARIAH received the following supervised modalities after being cleared for contradictions for 5 minutes:   -MHP    ZECHARIAH received therapeutic activities for 10 minutes including:  -elbow and wrist AROM    Patient Education and Home Exercises      Education provided:   -role of OT, goals for OT, scheduling/cancellations, insurance limitations with patient.  -Additional Education provided: diagnosis, precautions and progression of treatment    Written Home Exercises Provided: yes.  Exercises were reviewed and ZECHARIAH was able to demonstrate them prior to the end of the session.    ZECHARIAH demonstrated good  understanding of the education provided.     Pt was advised to perform these exercises free of pain, and to stop performing them if pain occurs.    See EMR under Patient Instructions for exercises provided 10/15/2024.    Assessment     Zechariah Potter is a 28 y.o. male referred to outpatient occupational therapy and presents with a medical diagnosis of Right radial head fracture.      Assessment of Occupational Performance   Performance Deficits    Physical:  Joint Mobility  Muscle Power/Strength  Edema   Strength  Pain    Cognitive:  No Deficits    Psychosocial:    No Deficits     Following medical record review it is determined that pt will benefit from occupational therapy services in  order to maximize pain free and/or functional use of right elbow. The following goals were discussed with the patient and patient is in agreement with them as to be addressed in the treatment plan. The patient's rehab potential is Excellent.     Anticipated barriers to occupational therapy: none    Plan of care discussed with patient: Yes  Patient's spiritual, cultural and educational needs considered and patient is agreeable to the plan of care and goals as stated below:     Medical Necessity is demonstrated by the following  Occupational Profile/History  Co-morbidities and personal factors that may impact the plan of care [x] LOW: Brief chart review  [] MODERATE: Expanded chart review   [] HIGH: Extensive chart review    Moderate / High Support Documentation:      Examination  Performance deficits relating to physical, cognitive or psychosocial skills that result in activity limitations and/or participation restrictions  [x] LOW: addressing 1-3 Performance deficits  [] MODERATE: 3-5 Performance deficits  [] HIGH: 5+ Performance deficits (please support below)    Moderate / High Support Documentation:      Treatment Options [x] LOW: Limited options  [] MODERATE: Several options  [] HIGH: Multiple options      Decision Making/ Complexity Score: low       Goals:   The following goals were discussed with the patient and patient is in agreement with them as to be addressed in the treatment plan.   Short term Goals:  1) Initiate HEP  2) Pt will increase AROM of R elbow by 5-10 degrees in order to assist with dressing and grooming by 4 weeks.  3) Pt will reduce edema by .5-1 cm in affected elbow by 4 weeks.  4) Pt will reduce pain to less than 4/10 by 4 weeks.  5) Pt will increase functional  strength by 5# in order to A in opening containers for med management or home management tasks by 4 weeks.     Long Term Goals:  Goals to be met by discharge:  1) Independent with HEP  2) Pt will increase R elbow [place]DAÑA by 20  degrees in order to increase functional use for grasp with home management or work related tasks by d/c.   3) Pt will decrease edema to trace or none to increase functional ROM by d/c.   4) Pt will decrease pain to trace or none while completing light home management tasks or work related tasks by d/c.   5) Patient will be able to return to golf and other hobbies without difficulty by d/c      Plan     Certification Period/Plan of care expiration: 10/15/2024 to 1/3/25.    Outpatient Occupational Therapy 2 times weekly for 8 weeks to include the following interventions: Paraffin, Fluidotherapy, Manual therapy/joint mobilizations, Modalities for pain management, US 3 mhz, Therapeutic exercises/activities., Strengthening, Edema Control, and Joint Protection.    Margaret Boasberg, OT

## 2024-10-15 NOTE — PATIENT INSTRUCTIONS
[facility], OCCUPATIONAL THERAPY  HOME EXERCISE PROGRAM     Use heat as needed    Complete the following exercises for 10 repetitions, 4x/day:     AROM: Wrist Flexion / Extension               Bend your wrist forward and back as far as possible.        AROM: Wrist Radial / Ulnar Deviation  Bend your wrist from side to side as far as possible.      AROM: Supination / Pronation   With your elbow by your side, turn your   palm up then turn your palm down.

## 2024-10-17 ENCOUNTER — CLINICAL SUPPORT (OUTPATIENT)
Dept: REHABILITATION | Facility: OTHER | Age: 29
End: 2024-10-17
Payer: COMMERCIAL

## 2024-10-17 DIAGNOSIS — M25.531 PAIN IN RIGHT WRIST: ICD-10-CM

## 2024-10-17 DIAGNOSIS — M25.621 STIFFNESS OF RIGHT ELBOW JOINT: ICD-10-CM

## 2024-10-17 DIAGNOSIS — M25.631 STIFFNESS OF RIGHT WRIST JOINT: ICD-10-CM

## 2024-10-17 DIAGNOSIS — M25.421 PAIN AND SWELLING OF RIGHT ELBOW: Primary | ICD-10-CM

## 2024-10-17 DIAGNOSIS — M25.521 PAIN AND SWELLING OF RIGHT ELBOW: Primary | ICD-10-CM

## 2024-10-17 PROCEDURE — 97530 THERAPEUTIC ACTIVITIES: CPT | Mod: PN | Performed by: OCCUPATIONAL THERAPIST

## 2024-10-17 PROCEDURE — 97140 MANUAL THERAPY 1/> REGIONS: CPT | Mod: PN | Performed by: OCCUPATIONAL THERAPIST

## 2024-10-17 NOTE — PROGRESS NOTES
"  Occupational Therapy Treatment Note     Date: 10/17/2024  Name: Zechariah Potter  [facility] Number: [id number]    Therapy Diagnosis:   Encounter Diagnoses   Name Primary?    Pain and swelling of right elbow Yes    Pain in right wrist     Stiffness of right elbow joint     Stiffness of right wrist joint      Physician: Arsh Coleman MD    Physician Orders: Eval and Treat  Medical Diagnosis:   M25.521 (ICD-10-CM) - Right elbow pain   S52.124A (ICD-10-CM) - Closed nondisplaced fracture of head of right radius, initial encounter      Date of Injury: 10/2/24  Evaluation Date: 10/15/2024  Insurance Authorization Period Expiration: 12/31/24  Plan of Care Certification Period: 1/3/25  Date of Return to MD: 11/15/24  FOTO: 0/3    Visit # / Visits authorized: 2 / 20    Time In: 9:15 am  Time Out: 10:00 am  Total Billable Time: 45 minutes    Precautions:  Standard      Subjective     History of Current Condition/Mechanism of Injury: 28 y.o. Male with a history of right elbow pain who presents today with his mom. He loves to play golf. He is referred from Marcell Mota. He states on 10/2/24 he slipped and landed with an outstretch hand. He presented at Urgent care and x-rays were taken. On 10/3/24 he saw Marcell Mota put him into a long arm cast. Patient was referred to Orthopaedics and then on to Occupational Therapy. He presents with c/o pain and stiffness in R elbow and wrist.    Pt reports: Pt reports he had pain when brushing his teeth this morning  he was compliant with home exercise program given last session.   Response to previous treatment: tolerated well     Pain: 4/10  Location: right elbow      OBJECTIVE     Observation/Appearance: mild bruising and swelling on ulnar volar R forearm and elbow; tenderness palpated throughout R wrist and forearm     Edema. Measured in centimeters.    10/15/2024 10/15/2024         Left Right       Wrist Crease 17.4 17.3       2" below elbow crease 28.0 28.6       Elbow Crease 27.5 28.8  "            Elbow and Wrist ROM. Measured in degrees.    10/15/2024 10/15/2024         Left Right       Elbow Ext/Flex 0/140 -30/125       Supination/Pronation 70/75 70/65       Wrist Ext/Flex 65/75 65/75       Wrist RD/UD 20/35 20/35           Strength (Dynamometer) and Pinch Strength (Pinch Gauge)  Measured in pounds.    10/15/2024 10/15/2024         Left Right       Rung II 72.8 24.9       Gambino Pinch           3pt Pinch               Treatment     JANINE received the following supervised modalities after being cleared for contradictions for 5 minutes:   -MHP    JANINE received the following manual therapy techniques for 10 minutes:   -STM to decrease stiffness and increase blood flow     JANINE received therapeutic activities for 30 minutes including:  -B UE dowel exercises: elbow ext/flex 2 ways, shoulder flexion, chest press, overhead shoulder press, and upright row x 20  -Wrist 3 ways with dowel x 20  -Wrist maze x 3 min  -UBE 3 min forward/3 min backward    Home Exercises and Education Provided     Education provided:   - Continue HEP  - Progress towards goals     Written Home Exercises Provided: Patient instructed to cont prior HEP.  Exercises were reviewed and JANINE was able to demonstrate them prior to the end of the session.  JANINE demonstrated good  understanding of the HEP provided.   .   See EMR under Patient Instructions for exercises provided prior visit.        Assessment     Pt would continue to benefit from skilled OT. He performed all treatment tasks without difficulty. Plan to progress as tolerated.     JANINE is progressing well towards his goals and there are no updates to goals at this time. Pt prognosis is Excellent.     Pt will continue to benefit from skilled outpatient occupational therapy to address the deficits listed in the problem list on initial evaluation provide pt/family education and to maximize pt's level of independence in the home and community environment.     Anticipated  barriers to occupational therapy: none    Pt's spiritual, cultural and educational needs considered and pt agreeable to plan of care and goals.    Goals:  The following goals were discussed with the patient and patient is in agreement with them as to be addressed in the treatment plan.   Short term Goals:  1) Initiate HEP Met  2) Pt will increase AROM of R elbow by 5-10 degrees in order to assist with dressing and grooming by 4 weeks. Progressing 10/17/2024  3) Pt will reduce edema by .5-1 cm in affected elbow by 4 weeks. Progressing 10/17/2024  4) Pt will reduce pain to less than 4/10 by 4 weeks. Progressing 10/17/2024  5) Pt will increase functional  strength by 5# in order to A in opening containers for med management or home management tasks by 4 weeks.  Progressing 10/17/2024     Long Term Goals:  Goals to be met by discharge:  1) Independent with HEP Progressing 10/17/2024  2) Pt will increase R elbow SALDAÑA by 20 degrees in order to increase functional use for grasp with home management or work related tasks by d/c.  Progressing 10/17/2024  3) Pt will decrease edema to trace or none to increase functional ROM by d/c.  Progressing 10/17/2024  4) Pt will decrease pain to trace or none while completing light home management tasks or work related tasks by d/c  Progressing 10/17/2024  5) Patient will be able to return to golf and other hobbies without difficulty by d/c  Progressing 10/17/2024    Plan   Continue with Plan of Care    Updates/Grading for next session: progress as tolerated      Margaret Boasberg, OT

## 2024-10-22 ENCOUNTER — CLINICAL SUPPORT (OUTPATIENT)
Dept: REHABILITATION | Facility: OTHER | Age: 29
End: 2024-10-22
Payer: COMMERCIAL

## 2024-10-22 DIAGNOSIS — M25.621 STIFFNESS OF RIGHT ELBOW JOINT: ICD-10-CM

## 2024-10-22 DIAGNOSIS — M25.521 PAIN AND SWELLING OF RIGHT ELBOW: Primary | ICD-10-CM

## 2024-10-22 DIAGNOSIS — M25.531 PAIN IN RIGHT WRIST: ICD-10-CM

## 2024-10-22 DIAGNOSIS — M25.421 PAIN AND SWELLING OF RIGHT ELBOW: Primary | ICD-10-CM

## 2024-10-22 DIAGNOSIS — M25.631 STIFFNESS OF RIGHT WRIST JOINT: ICD-10-CM

## 2024-10-22 PROCEDURE — 97140 MANUAL THERAPY 1/> REGIONS: CPT | Mod: PN | Performed by: OCCUPATIONAL THERAPIST

## 2024-10-22 PROCEDURE — 97530 THERAPEUTIC ACTIVITIES: CPT | Mod: PN | Performed by: OCCUPATIONAL THERAPIST

## 2024-10-22 NOTE — PROGRESS NOTES
"  Occupational Therapy Treatment Note     Date: 10/22/2024  Name: Zechariah Potter  [facility] Number: [id number]    Therapy Diagnosis:   Encounter Diagnoses   Name Primary?    Pain and swelling of right elbow Yes    Pain in right wrist     Stiffness of right elbow joint     Stiffness of right wrist joint      Physician: Arsh Coleman MD    Physician Orders: Eval and Treat  Medical Diagnosis:   M25.521 (ICD-10-CM) - Right elbow pain   S52.124A (ICD-10-CM) - Closed nondisplaced fracture of head of right radius, initial encounter      Date of Injury: 10/2/24  Evaluation Date: 10/15/2024  Insurance Authorization Period Expiration: 12/31/24  Plan of Care Certification Period: 1/3/25  Date of Return to MD: 11/15/24  FOTO: 0/3    Visit # / Visits authorized: 3 / 20    Time In: 9:15 am  Time Out: 10:00 am  Total Billable Time: 45 minutes    Precautions:  Standard      Subjective     History of Current Condition/Mechanism of Injury: 28 y.o. Male with a history of right elbow pain who presents today with his mom. He loves to play golf. He is referred from Marcell Mota. He states on 10/2/24 he slipped and landed with an outstretch hand. He presented at Urgent care and x-rays were taken. On 10/3/24 he saw Marcell Mota put him into a long arm cast. Patient was referred to Orthopaedics and then on to Occupational Therapy. He presents with c/o pain and stiffness in R elbow and wrist.    Pt reports: he was able to putt on Sunday but had pain with chipping  he was compliant with home exercise program given last session.   Response to previous treatment: tolerated well     Pain: 2/10  Location: right elbow      OBJECTIVE     Observation/Appearance: mild bruising and swelling on ulnar volar R forearm and elbow; tenderness palpated throughout R wrist and forearm     Edema. Measured in centimeters.    10/15/2024 10/15/2024  10/22/24       Left Right  Right     Wrist Crease 17.4 17.3 17.3      2" below elbow crease 28.0 28.6  28.2     Elbow " Crease 27.5 28.8 28.7           Elbow and Wrist ROM. Measured in degrees.    10/15/2024 10/15/2024  10/22/24       Left Right  Right     Elbow Ext/Flex 0/140 -30/125  -15/130     Supination/Pronation 70/75 70/65  75/80     Wrist Ext/Flex 65/75 65/75  65/75     Wrist RD/UD 20/35 20/35  20/35         Strength (Dynamometer) and Pinch Strength (Pinch Gauge)  Measured in pounds.    10/15/2024 10/15/2024  10/22/24       Left Right  Right     Rung II 72.8 24.9  64.4     Gambino Pinch           3pt Pinch               Treatment     JANINE received the following supervised modalities after being cleared for contradictions for 5 minutes:   -MHP    JANINE received the following manual therapy techniques for 10 minutes:   -STM to decrease stiffness and increase blood flow     JANINE received therapeutic activities for 30 minutes including:  -B UE dowel exercises: elbow ext/flex 2 ways, shoulder flexion, chest press, overhead shoulder press, and upright row x 20  -Wrist 3 ways with 1# x 20  -Wrist maze x 3 min  -Red flexbar smiles, frowns, twists and rotations x 20  -UBE 3 min forward/3 min backward    Home Exercises and Education Provided     Education provided:   - Continue HEP  - Progress towards goals     Written Home Exercises Provided: Patient instructed to cont prior HEP.  Exercises were reviewed and JANINE was able to demonstrate them prior to the end of the session.  JANINE demonstrated good  understanding of the HEP provided.   .   See EMR under Patient Instructions for exercises provided prior visit.        Assessment     Pt would continue to benefit from skilled OT. He demonstrates increased ROM and strength and reports decreased pain. Pt performed all treatment tasks without difficulty. Plan to progress as tolerated.     JANINE is progressing well towards his goals and there are no updates to goals at this time. Pt prognosis is Excellent.     Pt will continue to benefit from skilled outpatient occupational therapy  to address the deficits listed in the problem list on initial evaluation provide pt/family education and to maximize pt's level of independence in the home and community environment.     Anticipated barriers to occupational therapy: none    Pt's spiritual, cultural and educational needs considered and pt agreeable to plan of care and goals.    Goals:  The following goals were discussed with the patient and patient is in agreement with them as to be addressed in the treatment plan.   Short term Goals:  1) Initiate HEP Met  2) Pt will increase AROM of R elbow by 5-10 degrees in order to assist with dressing and grooming by 4 weeks. Progressing 10/22/2024  3) Pt will reduce edema by .5-1 cm in affected elbow by 4 weeks. Progressing 10/22/2024  4) Pt will reduce pain to less than 4/10 by 4 weeks. Progressing 10/22/2024  5) Pt will increase functional  strength by 5# in order to A in opening containers for med management or home management tasks by 4 weeks.  Progressing 10/22/2024     Long Term Goals:  Goals to be met by discharge:  1) Independent with HEP Progressing 10/22/2024  2) Pt will increase R elbow SALDAÑA by 20 degrees in order to increase functional use for grasp with home management or work related tasks by d/c.  Progressing 10/22/2024  3) Pt will decrease edema to trace or none to increase functional ROM by d/c.  Progressing 10/22/2024  4) Pt will decrease pain to trace or none while completing light home management tasks or work related tasks by d/c  Progressing 10/22/2024  5) Patient will be able to return to golf and other hobbies without difficulty by d/c  Progressing 10/22/2024    Plan   Continue with Plan of Care    Updates/Grading for next session: progress as tolerated      Margaret Boasberg, OT

## 2024-10-29 ENCOUNTER — CLINICAL SUPPORT (OUTPATIENT)
Dept: REHABILITATION | Facility: OTHER | Age: 29
End: 2024-10-29
Payer: COMMERCIAL

## 2024-10-29 DIAGNOSIS — M25.531 PAIN IN RIGHT WRIST: ICD-10-CM

## 2024-10-29 DIAGNOSIS — M25.621 STIFFNESS OF RIGHT ELBOW JOINT: ICD-10-CM

## 2024-10-29 DIAGNOSIS — M25.631 STIFFNESS OF RIGHT WRIST JOINT: ICD-10-CM

## 2024-10-29 DIAGNOSIS — M25.421 PAIN AND SWELLING OF RIGHT ELBOW: Primary | ICD-10-CM

## 2024-10-29 DIAGNOSIS — M25.521 PAIN AND SWELLING OF RIGHT ELBOW: Primary | ICD-10-CM

## 2024-10-29 PROCEDURE — 97530 THERAPEUTIC ACTIVITIES: CPT | Mod: PN,CO

## 2024-10-29 PROCEDURE — 97140 MANUAL THERAPY 1/> REGIONS: CPT | Mod: PN,CO

## 2024-10-30 ENCOUNTER — DOCUMENTATION ONLY (OUTPATIENT)
Dept: REHABILITATION | Facility: OTHER | Age: 29
End: 2024-10-30
Payer: COMMERCIAL

## 2024-11-15 ENCOUNTER — HOSPITAL ENCOUNTER (OUTPATIENT)
Dept: RADIOLOGY | Facility: HOSPITAL | Age: 29
Discharge: HOME | End: 2024-11-15
Attending: ORTHOPAEDIC SURGERY
Payer: COMMERCIAL

## 2024-11-15 ENCOUNTER — OFFICE VISIT (OUTPATIENT)
Dept: SPORTS MEDICINE | Facility: CLINIC | Age: 29
End: 2024-11-15
Payer: COMMERCIAL

## 2024-11-15 VITALS
HEART RATE: 106 BPM | SYSTOLIC BLOOD PRESSURE: 118 MMHG | WEIGHT: 198.94 LBS | BODY MASS INDEX: 27.85 KG/M2 | DIASTOLIC BLOOD PRESSURE: 79 MMHG | HEIGHT: 71 IN

## 2024-11-15 DIAGNOSIS — M25.521 RIGHT ELBOW PAIN: ICD-10-CM

## 2024-11-15 DIAGNOSIS — S52.124A CLOSED NONDISPLACED FRACTURE OF HEAD OF RIGHT RADIUS, INITIAL ENCOUNTER: Primary | ICD-10-CM

## 2024-11-15 PROCEDURE — 73080 X-RAY EXAM OF ELBOW: CPT | Mod: 26,RT,, | Performed by: RADIOLOGY

## 2024-11-15 PROCEDURE — 99999 PR PBB SHADOW E&M-EST. PATIENT-LVL III: CPT | Mod: PBBFAC,,, | Performed by: ORTHOPAEDIC SURGERY

## 2024-11-15 PROCEDURE — 73080 X-RAY EXAM OF ELBOW: CPT | Mod: TC,RT

## 2024-11-15 NOTE — PROGRESS NOTES
ESTABLISHED PATIENT    History 11/15/24  Zechariah returns to clinic today for follow up evaluation of his right nondisplaced radial head fracture. At his last appointment he was shown exercises for range of motion. He has been attending formal physical therapy as well.  He is still unable to fully extend his right arm.    History 10/4/2024  28 y.o. Male with a history of right elbow pain who presents today with his mom. He loves to play golf. He is referred from Marcell Mota. He states on 10/2/24 he slipped and landed with an outstretch hand. He presented at Urgent care and x-rays were taken. On 10/3/24 he saw Marcell Mota put him into a long arm cast.     - mechanical symptoms, - instability    Is affecting ADLs.  Pain is 0/10 at it's worst.        PAST MEDICAL HISTORY    Past Medical History:   Diagnosis Date    Anxiety     Asthma     Headache(784.0)     OCD (obsessive compulsive disorder)     PDD (pervasive developmental disorder)        PAST SURGICAL HISTORY     No past surgical history on file.    FAMILY HISTORY    Family History   Problem Relation Name Age of Onset    Migraines Mother         SOCIAL HISTORY    Social History     Socioeconomic History    Marital status: Single   Tobacco Use    Smoking status: Never    Smokeless tobacco: Never   Substance and Sexual Activity    Alcohol use: Yes     Comment: occ    Drug use: No    Sexual activity: Never     Social Drivers of Health     Financial Resource Strain: Patient Declined (2/16/2024)    Overall Financial Resource Strain (CARDIA)     Difficulty of Paying Living Expenses: Patient declined   Food Insecurity: Food Insecurity Present (2/16/2024)    Hunger Vital Sign     Worried About Running Out of Food in the Last Year: Often true     Ran Out of Food in the Last Year: Sometimes true   Transportation Needs: No Transportation Needs (2/16/2024)    PRAPARE - Transportation     Lack of Transportation (Medical): No     Lack of Transportation (Non-Medical): No    Physical Activity: Insufficiently Active (2/16/2024)    Exercise Vital Sign     Days of Exercise per Week: 2 days     Minutes of Exercise per Session: 40 min   Stress: Stress Concern Present (2/16/2024)    [employer] [place] [employer] [facility] - Occupational Stress Questionnaire     Feeling of Stress : To some extent   Housing Stability: High Risk (2/16/2024)    Housing Stability Vital Sign     Unable to Pay for Housing in the Last Year: Patient declined     Number of Places Lived in the Last Year: 20     Unstable Housing in the Last Year: Yes       MEDICATIONS      Current Outpatient Medications:     buPROPion (WELLBUTRIN XL) 150 MG TB24 tablet, Take 150 mg by mouth every morning., Disp: , Rfl:     dextroamphetamine-amphetamine (ADDERALL XR) 10 MG 24 hr capsule, Take 10 mg by mouth every morning., Disp: , Rfl:     doxycycline (DORYX) 100 MG EC tablet, Take 100 mg by mouth 2 (two) times daily., Disp: , Rfl:     ibuprofen (ADVIL,MOTRIN) 800 MG tablet, Take 1 tablet (800 mg total) by mouth 3 (three) times daily., Disp: 15 tablet, Rfl: 0    lisdexamfetamine (VYVANSE) 70 MG capsule, Take 70 mg by mouth. 1 Capsule Oral Every morning, Disp: , Rfl:     loratadine (CLARITIN) 10 mg tablet, Take 1 tablet (10 mg total) by mouth once daily., Disp: 30 tablet, Rfl: 0    sertraline (ZOLOFT) 100 MG tablet, Take 250 mg by mouth once daily. Take two pills daily, Disp: , Rfl:     topiramate (TOPAMAX) 100 MG tablet, Take 1 tablet (100 mg total) by mouth once daily., Disp: 90 tablet, Rfl: 3    ubrogepant (UBRELVY) 100 mg tablet, Take 1 tablet (100 mg total) by mouth as needed for Migraine. If symptoms persist or return, may repeat dose after 2 hours. Maximum: 200 mg per 24 hours, Disp: 10 tablet, Rfl: 12    ALLERGIES     Review of patient's allergies indicates:   Allergen Reactions    Cephalosporins      Other reaction(s): Hives    Penicillins      Other reaction(s): Hives         REVIEW OF SYSTEMS   Constitution: Negative.  Negative for chills, fever and night sweats.   HENT: Negative for congestion and headaches.    Eyes: Negative for blurred vision, left vision loss and right vision loss.   Cardiovascular: Negative for chest pain and syncope.   Respiratory: Negative for cough and shortness of breath.    Endocrine: Negative for polydipsia, polyphagia and polyuria.   Hematologic/Lymphatic: Negative for bleeding problem. Does not bruise/bleed easily.   Skin: Negative for dry skin, itching and rash.   Musculoskeletal: Negative for falls. Positive for right elbow pain   Gastrointestinal: Negative for abdominal pain and bowel incontinence.   Genitourinary: Negative for bladder incontinence and nocturia.   Neurological: Negative for disturbances in coordination, loss of balance and seizures.   Psychiatric/Behavioral: Negative for depression. The patient does not have insomnia.    Allergic/Immunologic: Negative for hives and persistent infections.     PHYSICAL EXAMINATION    Vitals: There were no vitals taken for this visit.    General: The patient appears active and healthy with no apparent physical problems.  No disturbance of mood or affect is demonstrated. Alert and Oriented.    HEENT: Eyes normal, pupils equally round, nose normal.    Resp: Equal and symmetrical chest rises. No wheezing    CV: Regular rate    Neck: Supple; nonpainful range of motion.    Extremities: no cyanosis, clubbing, edema, or diffuse swelling.  Palpable pulses, good capillary refill of the digits.  No coolness, discoloration, edema or obvious varicosities.    Skin: no lesions noted.    Lymphatic: no detected adenopathy in the upper or lower extremities.    Neurologic: normal mental status, normal reflexes, normal gait and balance.  Patient is alert and oriented to person, place and time.  No flaccidity or spasticity is noted.  No motor or sensory deficits are noted.  Light touch is intact    Orthopaedic: Elbow Exam-RIGHT    Inspection: Normal skin color and  appearance, no ecchymosis, mild swelling, no scars.  There is a normal carrying angle.      ROM: 15° - 135+° with 80° supination and 80° pronation.       Palpation: No tenderness at the medial epicondyle, olecranon, or lateral epicondyle.  +slight tenderness at radial head     The wrist flexor-pronator muscle group is nontender.    The wrist extensor mobile wad is nontender.    Strength: Flexor and extensor motor strength is 5/5.      Stability: Varus and valgus stress reveals no instability. No Guarding    Neuro Reflexes are 2/2 biceps, brachioradialis and triceps. Sensation intact    Test: - Milking maneuver - Moving valgus stress test - VEO - Thinker's - Tinel's Sign - Ulnar nerve subluxation - Hook Test - Pain with resisted wrist ext - Pain with long finger ext      IMAGING    X-Ray Elbow Complete Right  Narrative: EXAMINATION:  XR ELBOW COMPLETE 3 VIEW RIGHT    CLINICAL HISTORY:  . Pain in right elbow    TECHNIQUE:  AP, lateral, and oblique views of the right elbow were performed.    COMPARISON:  Fore arm study of October 2, 2024    FINDINGS:  Reconfirmed now subacute intra-articular minimally depressed the radial head fracture.  The appearance of the fracture site and the position and alignment of fracture fragments without detrimental changes.  No new fractures.  Reconfirmed joint effusion.  No areas of obvious soft tissue swelling.  Impression: As above    Electronically signed by: Shiv Valera  Date:    10/04/2024  Time:    10:31        IMPRESSION       ICD-10-CM ICD-9-CM   1. Closed nondisplaced fracture of head of right radius, initial encounter  S52.124A 813.05   2. Right elbow pain  M25.521 719.42         MEDICATIONS PRESCRIBED      none    RECOMMENDATIONS     I believe that we can proceed with conservative treatment, and he should continue with physical therapy   RTC in 6 weeks with Silvio Lewis PA-C repeat x-rays of the elbow  Dynasplint for elbow extension ordered today to help improve terminal  elbow extension      All questions were answered, pt will contact us for questions or concerns in the interim.

## 2024-12-04 DIAGNOSIS — G43.709 CHRONIC MIGRAINE WITHOUT AURA WITHOUT STATUS MIGRAINOSUS, NOT INTRACTABLE: ICD-10-CM

## 2024-12-16 RX ORDER — TOPIRAMATE 100 MG/1
100 TABLET, FILM COATED ORAL DAILY
Qty: 90 TABLET | Refills: 3 | Status: SHIPPED | OUTPATIENT
Start: 2024-12-16 | End: 2025-12-16

## 2024-12-27 ENCOUNTER — HOSPITAL ENCOUNTER (OUTPATIENT)
Dept: RADIOLOGY | Facility: HOSPITAL | Age: 29
Discharge: HOME | End: 2024-12-27
Attending: PHYSICIAN ASSISTANT
Payer: COMMERCIAL

## 2024-12-27 ENCOUNTER — OFFICE VISIT (OUTPATIENT)
Dept: SPORTS MEDICINE | Facility: CLINIC | Age: 29
End: 2024-12-27
Payer: COMMERCIAL

## 2024-12-27 VITALS
BODY MASS INDEX: 28.39 KG/M2 | SYSTOLIC BLOOD PRESSURE: 118 MMHG | HEIGHT: 71 IN | DIASTOLIC BLOOD PRESSURE: 81 MMHG | HEART RATE: 109 BPM | WEIGHT: 202.81 LBS

## 2024-12-27 DIAGNOSIS — M25.521 RIGHT ELBOW PAIN: ICD-10-CM

## 2024-12-27 DIAGNOSIS — S52.124D CLOSED NONDISPLACED FRACTURE OF HEAD OF RIGHT RADIUS WITH ROUTINE HEALING, SUBSEQUENT ENCOUNTER: ICD-10-CM

## 2024-12-27 DIAGNOSIS — M25.521 RIGHT ELBOW PAIN: Primary | ICD-10-CM

## 2024-12-27 PROCEDURE — 73080 X-RAY EXAM OF ELBOW: CPT | Mod: TC,RT

## 2024-12-27 PROCEDURE — 73080 X-RAY EXAM OF ELBOW: CPT | Mod: 26,RT,, | Performed by: RADIOLOGY

## 2024-12-27 PROCEDURE — 99999 PR PBB SHADOW E&M-EST. PATIENT-LVL III: CPT | Mod: PBBFAC,,, | Performed by: PHYSICIAN ASSISTANT

## 2024-12-27 NOTE — PROGRESS NOTES
ESTABLISHED PATIENT    History 12/27/2024:  Zechariah returns here today for follow-up evaluation of his right elbow.  He has had significant improvement in his pain and range of he has been able to return to playing golf without any discomfort or difficulty.    History 11/15/24  Zechariah returns to clinic today for follow up evaluation of his right nondisplaced radial head fracture. At his last appointment he was shown exercises for range of motion. He has been attending formal physical therapy as well.  He is still unable to fully extend his right arm.    History 10/4/2024  29 y.o. Male with a history of right elbow pain who presents today with his mom. He loves to play golf. He is referred from Marcell Mota. He states on 10/2/24 he slipped and landed with an outstretch hand. He presented at Urgent care and x-rays were taken. On 10/3/24 he saw Marcell Mota put him into a long arm cast.     - mechanical symptoms, - instability    Is affecting ADLs.  Pain is 0/10 at it's worst.        PAST MEDICAL HISTORY    Past Medical History:   Diagnosis Date    Anxiety     Asthma     Headache(784.0)     OCD (obsessive compulsive disorder)     PDD (pervasive developmental disorder)        PAST SURGICAL HISTORY     History reviewed. No pertinent surgical history.    FAMILY HISTORY    Family History   Problem Relation Name Age of Onset    Migraines Mother         SOCIAL HISTORY    Social History     Socioeconomic History    Marital status: Single   Tobacco Use    Smoking status: Never    Smokeless tobacco: Never   Substance and Sexual Activity    Alcohol use: Yes     Comment: occ    Drug use: No    Sexual activity: Never     Social Drivers of Health     Financial Resource Strain: Patient Declined (2/16/2024)    Overall Financial Resource Strain (CARDIA)     Difficulty of Paying Living Expenses: Patient declined   Food Insecurity: Food Insecurity Present (2/16/2024)    Hunger Vital Sign     Worried About Running Out of Food in the Last  Year: Often true     Ran Out of Food in the Last Year: Sometimes true   Transportation Needs: No Transportation Needs (2/16/2024)    PRAPARE - Transportation     Lack of Transportation (Medical): No     Lack of Transportation (Non-Medical): No   Physical Activity: Insufficiently Active (2/16/2024)    Exercise Vital Sign     Days of Exercise per Week: 2 days     Minutes of Exercise per Session: 40 min   Stress: Stress Concern Present (2/16/2024)    [employer] - Occupational Stress Questionnaire     Feeling of Stress : To some extent   Housing Stability: High Risk (2/16/2024)    Housing Stability Vital Sign     Unable to Pay for Housing in the Last Year: Patient declined     Number of Places Lived in the Last Year: 20     Unstable Housing in the Last Year: Yes       MEDICATIONS      Current Outpatient Medications:     buPROPion (WELLBUTRIN XL) 150 MG TB24 tablet, Take 150 mg by mouth every morning., Disp: , Rfl:     dextroamphetamine-amphetamine (ADDERALL XR) 10 MG 24 hr capsule, Take 10 mg by mouth every morning., Disp: , Rfl:     doxycycline (DORYX) 100 MG EC tablet, Take 100 mg by mouth 2 (two) times daily., Disp: , Rfl:     lisdexamfetamine (VYVANSE) 70 MG capsule, Take 70 mg by mouth. 1 Capsule Oral Every morning, Disp: , Rfl:     sertraline (ZOLOFT) 100 MG tablet, Take 250 mg by mouth once daily. Take two pills daily, Disp: , Rfl:     topiramate (TOPAMAX) 100 MG tablet, Take 1 tablet (100 mg total) by mouth once daily., Disp: 90 tablet, Rfl: 3    ubrogepant (UBRELVY) 100 mg tablet, Take 1 tablet (100 mg total) by mouth as needed for Migraine. If symptoms persist or return, may repeat dose after 2 hours. Maximum: 200 mg per 24 hours, Disp: 10 tablet, Rfl: 12    ibuprofen (ADVIL,MOTRIN) 800 MG tablet, Take 1 tablet (800 mg total) by mouth 3 (three) times daily., Disp: 15 tablet, Rfl: 0    loratadine (CLARITIN) 10 mg tablet, Take 1 tablet (10 mg total) by mouth once daily., Disp: 30  "tablet, Rfl: 0    ALLERGIES     Review of patient's allergies indicates:   Allergen Reactions    Cephalosporins      Other reaction(s): Hives    Penicillins      Other reaction(s): Hives         REVIEW OF SYSTEMS   Constitution: Negative. Negative for chills, fever and night sweats.   HENT: Negative for congestion and headaches.    Eyes: Negative for blurred vision, left vision loss and right vision loss.   Cardiovascular: Negative for chest pain and syncope.   Respiratory: Negative for cough and shortness of breath.    Endocrine: Negative for polydipsia, polyphagia and polyuria.   Hematologic/Lymphatic: Negative for bleeding problem. Does not bruise/bleed easily.   Skin: Negative for dry skin, itching and rash.   Musculoskeletal: Negative for falls. Positive for right elbow pain   Gastrointestinal: Negative for abdominal pain and bowel incontinence.   Genitourinary: Negative for bladder incontinence and nocturia.   Neurological: Negative for disturbances in coordination, loss of balance and seizures.   Psychiatric/Behavioral: Negative for depression. The patient does not have insomnia.    Allergic/Immunologic: Negative for hives and persistent infections.     PHYSICAL EXAMINATION    Vitals: /81   Pulse 109   Ht 5' 11" (1.803 m)   Wt 92 kg (202 lb 13.2 oz)   BMI 28.29 kg/m²     General: The patient appears active and healthy with no apparent physical problems.  No disturbance of mood or affect is demonstrated. Alert and Oriented.    HEENT: Eyes normal, pupils equally round, nose normal.    Resp: Equal and symmetrical chest rises. No wheezing    CV: Regular rate    Neck: Supple; nonpainful range of motion.    Extremities: no cyanosis, clubbing, edema, or diffuse swelling.  Palpable pulses, good capillary refill of the digits.  No coolness, discoloration, edema or obvious varicosities.    Skin: no lesions noted.    Lymphatic: no detected adenopathy in the upper or lower extremities.    Neurologic: normal " mental status, normal reflexes, normal gait and balance.  Patient is alert and oriented to person, place and time.  No flaccidity or spasticity is noted.  No motor or sensory deficits are noted.  Light touch is intact    Orthopaedic: Elbow Exam-RIGHT    Inspection: Normal skin color and appearance, no ecchymosis, no swelling, no scars.  There is a normal carrying angle.      ROM: 15° - 135+° with 80° supination and 80° pronation.       Palpation: No tenderness at the medial epicondyle, olecranon, or lateral epicondyle.  No tenderness at radial head     The wrist flexor-pronator muscle group is nontender.    The wrist extensor mobile wad is nontender.    Strength: Flexor and extensor motor strength is 5/5.      Stability: Varus and valgus stress reveals no instability. No Guarding    Neuro Reflexes are 2/2 biceps, brachioradialis and triceps. Sensation intact    Test: - Milking maneuver - Moving valgus stress test - VEO - Thinker's - Tinel's Sign - Ulnar nerve subluxation - Hook Test - Pain with resisted wrist ext - Pain with long finger ext      IMAGING    X-Ray Elbow Complete Right  Narrative: EXAMINATION:  XR ELBOW COMPLETE 3 VIEW RIGHT    CLINICAL HISTORY:  . Pain in right elbow    TECHNIQUE:  AP, lateral, and oblique views of the right elbow were performed.    COMPARISON:  Non 11/15/2024 e    FINDINGS:  Healing fracture of the radial head remain in unchanged and satisfactory position as compared to the previous study  Impression: See above    Electronically signed by: Magdaleno Rios MD  Date:    12/27/2024  Time:    13:52        X-Ray Elbow Complete Right  Narrative: EXAMINATION:  XR ELBOW COMPLETE 3 VIEW RIGHT    CLINICAL HISTORY:  . Pain in right elbow    TECHNIQUE:  AP, lateral, and oblique views of the right elbow were performed.    COMPARISON:  No 10/04/2024 ne    FINDINGS:  Healing fracture of the radial head remain in unchanged and satisfactory position as compared to the previous study  Impression: See  above    Electronically signed by: Magdaleno Rios MD  Date:    11/15/2024  Time:    09:38        IMPRESSION       ICD-10-CM ICD-9-CM   1. Right elbow pain  M25.521 719.42   2. Closed nondisplaced fracture of head of right radius with routine healing, subsequent encounter  S52.124D V54.12           MEDICATIONS PRESCRIBED      none    RECOMMENDATIONS     He has responded very well to conservative care and requires no aggressive treatment at this time  Continue to progress physical activities as tolerated   RTC in 2 months with repeat x-rays of the elbow      All questions were answered, pt will contact us for questions or concerns in the interim.        Silvio Lewis PA-C, MMS

## 2025-03-05 ENCOUNTER — HOSPITAL ENCOUNTER (OUTPATIENT)
Dept: RADIOLOGY | Facility: HOSPITAL | Age: 30
Discharge: HOME | End: 2025-03-05
Attending: PHYSICIAN ASSISTANT
Payer: COMMERCIAL

## 2025-03-05 ENCOUNTER — OFFICE VISIT (OUTPATIENT)
Dept: SPORTS MEDICINE | Facility: CLINIC | Age: 30
End: 2025-03-05
Payer: COMMERCIAL

## 2025-03-05 VITALS — DIASTOLIC BLOOD PRESSURE: 79 MMHG | SYSTOLIC BLOOD PRESSURE: 124 MMHG | HEART RATE: 88 BPM

## 2025-03-05 DIAGNOSIS — M25.521 RIGHT ELBOW PAIN: ICD-10-CM

## 2025-03-05 DIAGNOSIS — S52.124D CLOSED NONDISPLACED FRACTURE OF HEAD OF RIGHT RADIUS WITH ROUTINE HEALING, SUBSEQUENT ENCOUNTER: ICD-10-CM

## 2025-03-05 DIAGNOSIS — M25.521 RIGHT ELBOW PAIN: Primary | ICD-10-CM

## 2025-03-05 PROCEDURE — 1160F RVW MEDS BY RX/DR IN RCRD: CPT | Mod: CPTII,S$GLB,, | Performed by: PHYSICIAN ASSISTANT

## 2025-03-05 PROCEDURE — 3074F SYST BP LT 130 MM HG: CPT | Mod: CPTII,S$GLB,, | Performed by: PHYSICIAN ASSISTANT

## 2025-03-05 PROCEDURE — 73080 X-RAY EXAM OF ELBOW: CPT | Mod: TC,RT

## 2025-03-05 PROCEDURE — 1159F MED LIST DOCD IN RCRD: CPT | Mod: CPTII,S$GLB,, | Performed by: PHYSICIAN ASSISTANT

## 2025-03-05 PROCEDURE — 3078F DIAST BP <80 MM HG: CPT | Mod: CPTII,S$GLB,, | Performed by: PHYSICIAN ASSISTANT

## 2025-03-05 PROCEDURE — 73080 X-RAY EXAM OF ELBOW: CPT | Mod: 26,RT,, | Performed by: RADIOLOGY

## 2025-03-05 PROCEDURE — 99214 OFFICE O/P EST MOD 30 MIN: CPT | Mod: S$GLB,,, | Performed by: PHYSICIAN ASSISTANT

## 2025-03-05 PROCEDURE — 99999 PR PBB SHADOW E&M-EST. PATIENT-LVL III: CPT | Mod: PBBFAC,,, | Performed by: PHYSICIAN ASSISTANT

## 2025-03-05 NOTE — PROGRESS NOTES
ESTABLISHED PATIENT    History 3/5/2025:  History of Present Illness    CHIEF COMPLAINT:  - Right elbow injury follow-up    HPI:  Zechariah presents for follow-up of an elbow injury that occurred approximately five months ago. Zechariah reports the elbow is improving significantly and feeling close to normal. Zechariah has been able to play golf without any issues, pain, or discomfort, completing a full round without limitations. Zechariah denies feeling any current discomfort or limitations in the elbow. Zechariah also denies any popping sensation with rotation of the elbow.     WORK STATUS:  - Zechariah plays golf and has been able to return to playing full rounds without any limitations, pain, or discomfort.         History 12/27/2024:  Zechariah returns here today for follow-up evaluation of his right elbow.  He has had significant improvement in his pain and range of he has been able to return to playing golf without any discomfort or difficulty.    History 11/15/24  Zechariah returns to clinic today for follow up evaluation of his right nondisplaced radial head fracture. At his last appointment he was shown exercises for range of motion. He has been attending formal physical therapy as well.  He is still unable to fully extend his right arm.    History 10/4/2024  29 y.o. Male with a history of right elbow pain who presents today with his mom. He loves to play golf. He is referred from Marcell Mota. He states on 10/2/24 he slipped and landed with an outstretch hand. He presented at Urgent care and x-rays were taken. On 10/3/24 he saw Marcell Mota put him into a long arm cast.     - mechanical symptoms, - instability    Is affecting ADLs.  Pain is 0/10 at it's worst.        PAST MEDICAL HISTORY    Past Medical History:   Diagnosis Date    Anxiety     Asthma     Headache(784.0)     OCD (obsessive compulsive disorder)     PDD (pervasive developmental disorder)        PAST SURGICAL HISTORY     History reviewed. No pertinent surgical  history.    FAMILY HISTORY    Family History   Problem Relation Name Age of Onset    Migraines Mother         SOCIAL HISTORY    Social History     Socioeconomic History    Marital status: Single   Tobacco Use    Smoking status: Never    Smokeless tobacco: Never   Substance and Sexual Activity    Alcohol use: Yes     Comment: occ    Drug use: No    Sexual activity: Never     Social Drivers of Health     Financial Resource Strain: Patient Declined (2/16/2024)    Overall Financial Resource Strain (CARDIA)     Difficulty of Paying Living Expenses: Patient declined   Food Insecurity: Food Insecurity Present (2/16/2024)    Hunger Vital Sign     Worried About Running Out of Food in the Last Year: Often true     Ran Out of Food in the Last Year: Sometimes true   Transportation Needs: No Transportation Needs (2/16/2024)    PRAPARE - Transportation     Lack of Transportation (Medical): No     Lack of Transportation (Non-Medical): No   Physical Activity: Insufficiently Active (2/16/2024)    Exercise Vital Sign     Days of Exercise per Week: 2 days     Minutes of Exercise per Session: 40 min   Stress: Stress Concern Present (2/16/2024)    [employer] - Occupational Stress Questionnaire     Feeling of Stress : To some extent   Housing Stability: High Risk (2/16/2024)    Housing Stability Vital Sign     Unable to Pay for Housing in the Last Year: Patient declined     Number of Places Lived in the Last Year: 20     Unstable Housing in the Last Year: Yes       MEDICATIONS      Current Outpatient Medications:     buPROPion (WELLBUTRIN XL) 150 MG TB24 tablet, Take 150 mg by mouth every morning., Disp: , Rfl:     dextroamphetamine-amphetamine (ADDERALL XR) 10 MG 24 hr capsule, Take 10 mg by mouth every morning., Disp: , Rfl:     doxycycline (DORYX) 100 MG EC tablet, Take 100 mg by mouth 2 (two) times daily., Disp: , Rfl:     lisdexamfetamine (VYVANSE) 70 MG capsule, Take 70 mg by mouth. 1 Capsule Oral  Every morning, Disp: , Rfl:     loratadine (CLARITIN) 10 mg tablet, Take 1 tablet (10 mg total) by mouth once daily., Disp: 30 tablet, Rfl: 0    sertraline (ZOLOFT) 100 MG tablet, Take 250 mg by mouth once daily. Take two pills daily, Disp: , Rfl:     topiramate (TOPAMAX) 100 MG tablet, Take 1 tablet (100 mg total) by mouth once daily., Disp: 90 tablet, Rfl: 3    ubrogepant (UBRELVY) 100 mg tablet, Take 1 tablet (100 mg total) by mouth as needed for Migraine. If symptoms persist or return, may repeat dose after 2 hours. Maximum: 200 mg per 24 hours, Disp: 10 tablet, Rfl: 12    ALLERGIES     Review of patient's allergies indicates:   Allergen Reactions    Cephalosporins      Other reaction(s): Hives    Penicillins      Other reaction(s): Hives         REVIEW OF SYSTEMS   Constitution: Negative. Negative for chills, fever and night sweats.   HENT: Negative for congestion and headaches.    Eyes: Negative for blurred vision, left vision loss and right vision loss.   Cardiovascular: Negative for chest pain and syncope.   Respiratory: Negative for cough and shortness of breath.    Endocrine: Negative for polydipsia, polyphagia and polyuria.   Hematologic/Lymphatic: Negative for bleeding problem. Does not bruise/bleed easily.   Skin: Negative for dry skin, itching and rash.   Musculoskeletal: Negative for falls. Negative for right elbow pain   Gastrointestinal: Negative for abdominal pain and bowel incontinence.   Genitourinary: Negative for bladder incontinence and nocturia.   Neurological: Negative for disturbances in coordination, loss of balance and seizures.   Psychiatric/Behavioral: Negative for depression. The patient does not have insomnia.    Allergic/Immunologic: Negative for hives and persistent infections.     PHYSICAL EXAMINATION    Vitals: /79   Pulse 88     General: The patient appears active and healthy with no apparent physical problems.  No disturbance of mood or affect is demonstrated. Alert and  Oriented.    HEENT: Eyes normal, pupils equally round, nose normal.    Resp: Equal and symmetrical chest rises. No wheezing    CV: Regular rate    Neck: Supple; nonpainful range of motion.    Extremities: no cyanosis, clubbing, edema, or diffuse swelling.  Palpable pulses, good capillary refill of the digits.  No coolness, discoloration, edema or obvious varicosities.    Skin: no lesions noted.    Lymphatic: no detected adenopathy in the upper or lower extremities.    Neurologic: normal mental status, normal reflexes, normal gait and balance.  Patient is alert and oriented to person, place and time.  No flaccidity or spasticity is noted.  No motor or sensory deficits are noted.  Light touch is intact    Orthopaedic: Elbow Exam - RIGHT    Inspection: Normal skin color and appearance, no ecchymosis, no swelling, no scars.  There is a normal carrying angle.      ROM: 15° - 135+° with 80° supination and 80° pronation.       Palpation: No tenderness at the medial epicondyle, olecranon, or lateral epicondyle.  No tenderness at radial head     The wrist flexor-pronator muscle group is nontender.    The wrist extensor mobile wad is nontender.    Strength: Flexor and extensor motor strength is 5/5.      Stability: Varus and valgus stress reveals no instability. No Guarding    Neuro Reflexes are 2/2 biceps, brachioradialis and triceps. Sensation intact    Test: - Milking maneuver - Moving valgus stress test - VEO - Thinker's - Tinel's Sign - Ulnar nerve subluxation - Hook Test - Pain with resisted wrist ext - Pain with long finger ext      IMAGING    X-Ray Elbow Complete Right  Narrative: EXAMINATION:  XR ELBOW COMPLETE 3 VIEW RIGHT    TECHNIQUE:  Three views of the right elbow were obtained, with AP, lateral, and oblique projections submitted.    COMPARISON:  Comparison is made to 12/27/2024 and 10/04/2024.    FINDINGS:  Again demonstrated is a right radial head fracture, initially documented on 10/04/2024, with interval  healing since that time observed.  No additional areas suspicious for recent fracture or other significant osseous abnormality identified.  No current elbow joint effusion.  No detrimental interval change since 12/27/2024 is appreciated.  Impression: As above    Electronically signed by: Magdaleno Loera MD  Date:    03/05/2025  Time:    11:21    X-Ray Elbow Complete Right  Order: [id number]   Status: Final result       Next appt: None       Dx: Right elbow pain    Test Result Released: Yes (seen)    0 Result Notes  Details    Reading Physician Reading Date Result Priority   Magdaleno Rios MD  [phone]  [phone]  12/27/2024 Routine     Narrative & Impression  EXAMINATION:  XR ELBOW COMPLETE 3 VIEW RIGHT     CLINICAL HISTORY:  . Pain in right elbow     TECHNIQUE:  AP, lateral, and oblique views of the right elbow were performed.     COMPARISON:  Non 11/15/2024 e     FINDINGS:  Healing fracture of the radial head remain in unchanged and satisfactory position as compared to the previous study     Impression:     See above        Electronically signed by:Magdaleno Rios MD  Date:                                            12/27/2024  Time:                                           13:52        Exam Ended: 12/27/24 09:30 CST Last Resulted: 12/27/24 13:52 CST       IMPRESSION       ICD-10-CM ICD-9-CM   1. Right elbow pain  M25.521 719.42   2. Closed nondisplaced fracture of head of right radius with routine healing, subsequent encounter  S52.124D V54.12           MEDICATIONS PRESCRIBED      none    RECOMMENDATIONS     The fracture is healed clinically  Continue to progress physical activities as tolerated   RTC as needed      All questions were answered, pt will contact us for questions or concerns in the interim.        Silvio Lewis PA-C, [facility]